# Patient Record
Sex: FEMALE | Race: WHITE | Employment: FULL TIME | URBAN - METROPOLITAN AREA
[De-identification: names, ages, dates, MRNs, and addresses within clinical notes are randomized per-mention and may not be internally consistent; named-entity substitution may affect disease eponyms.]

---

## 2017-01-27 ENCOUNTER — APPOINTMENT (OUTPATIENT)
Dept: ULTRASOUND IMAGING | Facility: CLINIC | Age: 22
End: 2017-01-27
Attending: EMERGENCY MEDICINE
Payer: COMMERCIAL

## 2017-01-27 ENCOUNTER — HOSPITAL ENCOUNTER (EMERGENCY)
Facility: CLINIC | Age: 22
Discharge: HOME | End: 2017-01-27
Attending: EMERGENCY MEDICINE | Admitting: EMERGENCY MEDICINE
Payer: COMMERCIAL

## 2017-01-27 VITALS
RESPIRATION RATE: 16 BRPM | HEIGHT: 61 IN | OXYGEN SATURATION: 100 % | WEIGHT: 124 LBS | BODY MASS INDEX: 23.41 KG/M2 | HEART RATE: 91 BPM | DIASTOLIC BLOOD PRESSURE: 73 MMHG | SYSTOLIC BLOOD PRESSURE: 118 MMHG | TEMPERATURE: 98.6 F

## 2017-01-27 DIAGNOSIS — L03.116 CELLULITIS OF LEFT LOWER EXTREMITY: ICD-10-CM

## 2017-01-27 DIAGNOSIS — L92.3 TATTOO REACTION: ICD-10-CM

## 2017-01-27 LAB
BASOPHILS # BLD AUTO: 0 10E9/L (ref 0–0.2)
BASOPHILS NFR BLD AUTO: 0.3 %
CRP SERPL-MCNC: 4.2 MG/L (ref 0–8)
DIFFERENTIAL METHOD BLD: ABNORMAL
EOSINOPHIL # BLD AUTO: 0.2 10E9/L (ref 0–0.7)
EOSINOPHIL NFR BLD AUTO: 2.4 %
ERYTHROCYTE [DISTWIDTH] IN BLOOD BY AUTOMATED COUNT: 12.6 % (ref 10–15)
ERYTHROCYTE [SEDIMENTATION RATE] IN BLOOD BY WESTERGREN METHOD: 42 MM/H (ref 0–20)
HCT VFR BLD AUTO: 35.3 % (ref 35–47)
HGB BLD-MCNC: 11.4 G/DL (ref 11.7–15.7)
IMM GRANULOCYTES # BLD: 0 10E9/L (ref 0–0.4)
IMM GRANULOCYTES NFR BLD: 0.1 %
LYMPHOCYTES # BLD AUTO: 2.2 10E9/L (ref 0.8–5.3)
LYMPHOCYTES NFR BLD AUTO: 30.6 %
MCH RBC QN AUTO: 29.8 PG (ref 26.5–33)
MCHC RBC AUTO-ENTMCNC: 32.3 G/DL (ref 31.5–36.5)
MCV RBC AUTO: 92 FL (ref 78–100)
MONOCYTES # BLD AUTO: 0.5 10E9/L (ref 0–1.3)
MONOCYTES NFR BLD AUTO: 6.7 %
NEUTROPHILS # BLD AUTO: 4.3 10E9/L (ref 1.6–8.3)
NEUTROPHILS NFR BLD AUTO: 59.9 %
PLATELET # BLD AUTO: 265 10E9/L (ref 150–450)
RBC # BLD AUTO: 3.82 10E12/L (ref 3.8–5.2)
WBC # BLD AUTO: 7.2 10E9/L (ref 4–11)

## 2017-01-27 PROCEDURE — 86140 C-REACTIVE PROTEIN: CPT | Performed by: EMERGENCY MEDICINE

## 2017-01-27 PROCEDURE — 99284 EMERGENCY DEPT VISIT MOD MDM: CPT | Performed by: EMERGENCY MEDICINE

## 2017-01-27 PROCEDURE — 85025 COMPLETE CBC W/AUTO DIFF WBC: CPT | Performed by: EMERGENCY MEDICINE

## 2017-01-27 PROCEDURE — 93971 EXTREMITY STUDY: CPT | Mod: LT

## 2017-01-27 PROCEDURE — 85652 RBC SED RATE AUTOMATED: CPT | Performed by: EMERGENCY MEDICINE

## 2017-01-27 PROCEDURE — 99284 EMERGENCY DEPT VISIT MOD MDM: CPT | Mod: 25

## 2017-01-27 RX ORDER — CEPHALEXIN 500 MG/1
500 CAPSULE ORAL 3 TIMES DAILY
Qty: 21 CAPSULE | Refills: 0 | Status: SHIPPED | OUTPATIENT
Start: 2017-01-27 | End: 2017-02-03

## 2017-01-27 RX ORDER — IBUPROFEN 600 MG/1
600 TABLET, FILM COATED ORAL EVERY 6 HOURS PRN
Qty: 40 TABLET | Refills: 0 | Status: SHIPPED | OUTPATIENT
Start: 2017-01-27 | End: 2019-03-12

## 2017-01-27 ASSESSMENT — ENCOUNTER SYMPTOMS
CHILLS: 0
VOMITING: 0
DIARRHEA: 0
FEVER: 0
NAUSEA: 0
JOINT SWELLING: 1
SHORTNESS OF BREATH: 0

## 2017-01-27 NOTE — ED PROVIDER NOTES
History     Chief Complaint   Patient presents with     Leg Swelling     The history is provided by the patient.     Angella Sutton is a 21 year old female who presents to the emergency department with left calf swelling. Patient states that on Sunday she had a new tattoo placed on her left calf and immediately had swelling and pain. She says she has 20 other tattoos and has never had this much swelling or pain with her other tattoos. Patient reports that in the morning the swelling is the worst and it takes an hour to get out of bed because the swelling is significant. She denies fever, chills, nausea, vomiting, diarrhea, urinary symptoms, chest pain, shortness of breath, left knee pain, or other skin issues. Patient has used ice and taken ibuprofen. Patient smokes 5-6 cigarettes a day. Patient went in to the Urgent Care today and they sent her to the ED for further evaluation. Patient is otherwise healthy.    I have reviewed the Medications, Allergies, Past Medical and Surgical History, and Social History in the Epic system.    Patient Active Problem List   Diagnosis     Major depressive disorder, recurrent episode, severe (H)     Generalized anxiety disorder     Trichotillomania     Other disorder of eating of nonorganic origin     Mood disorder (H)     Suicidal overdose (H)     History reviewed. No pertinent past medical history.    History reviewed. No pertinent past surgical history.    No family history on file.    Social History   Substance Use Topics     Smoking status: Current Every Day Smoker     Smokeless tobacco: Not on file      Comment: pt smokes cigarettes on and off     Alcohol Use: Yes          There is no immunization history on file for this patient.       Allergies   Allergen Reactions     Azithromycin Swelling       Current Outpatient Prescriptions   Medication Sig Dispense Refill     cephALEXin (KEFLEX) 500 MG capsule Take 1 capsule (500 mg) by mouth 3 times daily for 7 days 21 capsule 0      "ibuprofen (ADVIL/MOTRIN) 600 MG tablet Take 1 tablet (600 mg) by mouth every 6 hours as needed for moderate pain 40 tablet 0     Review of Systems   Constitutional: Negative for fever and chills.   Respiratory: Negative for shortness of breath.    Cardiovascular: Negative for chest pain.   Gastrointestinal: Negative for nausea, vomiting and diarrhea.   Genitourinary: Negative.    Musculoskeletal: Positive for joint swelling (left calf).   Skin: Negative for rash.   All other systems reviewed and are negative.      Physical Exam   BP: 118/73 mmHg  Pulse: 91  Resp: 16  Height: 154.9 cm (5' 1\")  Weight: 56.246 kg (124 lb)  SpO2: 100 %  Physical Exam   Constitutional: She is oriented to person, place, and time. She appears well-developed and well-nourished.   Healthy-appearing female sitting on the bed   HENT:   Head: Normocephalic and atraumatic.   Nose: Nose normal.   Mouth/Throat: Oropharynx is clear and moist.   Eyes: Conjunctivae and EOM are normal.   Neck: Normal range of motion. Neck supple.   Cardiovascular: Normal rate, regular rhythm, normal heart sounds and intact distal pulses.    Pulmonary/Chest: Effort normal and breath sounds normal.   Musculoskeletal: Normal range of motion.        Left knee: Normal. She exhibits normal range of motion.   Left calf with swelling, tightness, edema from foot to knee with erythema around the area of the new tattoo posteriorly along with tenderness. No crepitus, fluctuance or drainage.   Neurological: She is alert and oriented to person, place, and time.   Skin: Skin is warm and dry. She is not diaphoretic.   Patient's left calf has a very new appearing colorful tattoo. The skin is erythematous but there is not a significant amount of warmth.   Psychiatric: She has a normal mood and affect. Her behavior is normal.   Nursing note and vitals reviewed.      ED Course (with Medical Decision Making)    Pt seen and examined by me.  RN and EPIC notes reviewed.      Patient with " left calf pain and tenderness after getting a tattoo. She was sent from urgent care with concerns for possible DVT. Although this is on the list, I think she most likely has either an early cellulitis versus tattoo dye reaction. I'm going to check basic labs and an ultrasound.     Labs are normal except for elevated sed rate. Ultrasound negative.     Results discussed with the patient. I'm going to give her an Rx for Keflex. Rest, ice and elevate the leg. Ibuprofen for pain and inflammation. Follow up in clinic if not improving through the weekend and return for any worsening, changes or concerns.    Procedures     Results for orders placed or performed during the hospital encounter of 01/27/17 (from the past 24 hour(s))   CBC with platelets differential   Result Value Ref Range    WBC 7.2 4.0 - 11.0 10e9/L    RBC Count 3.82 3.8 - 5.2 10e12/L    Hemoglobin 11.4 (L) 11.7 - 15.7 g/dL    Hematocrit 35.3 35.0 - 47.0 %    MCV 92 78 - 100 fl    MCH 29.8 26.5 - 33.0 pg    MCHC 32.3 31.5 - 36.5 g/dL    RDW 12.6 10.0 - 15.0 %    Platelet Count 265 150 - 450 10e9/L    Diff Method Automated Method     % Neutrophils 59.9 %    % Lymphocytes 30.6 %    % Monocytes 6.7 %    % Eosinophils 2.4 %    % Basophils 0.3 %    % Immature Granulocytes 0.1 %    Absolute Neutrophil 4.3 1.6 - 8.3 10e9/L    Absolute Lymphocytes 2.2 0.8 - 5.3 10e9/L    Absolute Monocytes 0.5 0.0 - 1.3 10e9/L    Absolute Eosinophils 0.2 0.0 - 0.7 10e9/L    Absolute Basophils 0.0 0.0 - 0.2 10e9/L    Abs Immature Granulocytes 0.0 0 - 0.4 10e9/L   CRP inflammation   Result Value Ref Range    CRP Inflammation 4.2 0.0 - 8.0 mg/L   Erythrocyte sedimentation rate auto   Result Value Ref Range    Sed Rate 42 (H) 0 - 20 mm/h   US Lower Extremity Venous Duplex Left    Narrative    US LOWER EXTREMITY VENOUS DUPLEX LEFT 1/27/2017 5:41 PM    HISTORY: Swelling.    TECHNIQUE: Imaged deep venous structures of the left lower extremity  include the left common femoral vein, femoral  vein, popliteal vein,  and visualized posterior deep calf veins.  Color flow and spectral  Doppler with waveform analysis performed.    COMPARISON: None.    FINDINGS: No DVT is demonstrated.      Impression    IMPRESSION: Negative.     ELMO MALDONADO MD       Medications - No data to display    Assessments & Plan     I have reviewed the findings, diagnosis, plan and need for follow up with the patient.    Discharge Medication List as of 1/27/2017  6:15 PM      START taking these medications    Details   cephALEXin (KEFLEX) 500 MG capsule Take 1 capsule (500 mg) by mouth 3 times daily for 7 days, Disp-21 capsule, R-0, E-Prescribe      ibuprofen (ADVIL/MOTRIN) 600 MG tablet Take 1 tablet (600 mg) by mouth every 6 hours as needed for moderate pain, Disp-40 tablet, R-0, E-Prescribe             Final diagnoses:   Tattoo reaction   Cellulitis of left lower extremity     Disposition: Patient discharged home in stable condition. Plan as above. Return for concerns.    This document serves as a record of services personally performed by Norma Emerson MD. It was created on their behalf by Shadia Santacruz, a trained medical scribe. The creation of this record is based on the provider's personal observations and the statements of the patient. This document has been checked and approved by the attending provider.     Note: Chart documentation done in part with Dragon Voice Recognition software. Although reviewed after completion, some word and grammatical errors may remain.    1/27/2017   [facility] EMERGENCY DEPARTMENT      Norma Emerson MD  01/27/17 3488

## 2017-01-27 NOTE — ED AVS SNAPSHOT
[facility] Emergency Department    [address] [place] [address]    Phone:  [phone]    Fax:  [phone]                                       Angella Sutton   MRN: [id number]    Department:  [facility] Emergency Department   Date of Visit:  1/27/2017           Patient Information     Date Of Birth          1995        Your diagnoses for this visit were:     Tattoo reaction     Cellulitis of left lower extremity        You were seen by Norma Emerson MD.      Follow-up Information     Follow up with Clinic, [facility] In 1 week.    Why:  For wound re-check    Contact information:    [address] [place] [address] [address]  [facility] [address]  [phone]          Discharge Instructions       The ultrasound was normal.    Your lab work shows that there is some inflammation, but otherwise was normal.    Continue cool packs to the leg.    Ibuprofen for pain and inflammation.    Antibiotics as prescribed.    Elevate the leg as much as possible to decrease swelling.    Please be sure to let your tattoo people know about the leg.    Return to the ED at any time for worsening, changes or concerns.    I hope you heal quickly!!    Discharge References/Attachments     SKIN INFECTION, CELLULITIS (ENGLISH)    CELLULITIS, DISCHARGE INSTRUCTIONS FOR (ENGLISH)      24 Hour Appointment Hotline       To make an appointment at any [facility] clinic, call [phone] ([phone]). If you don't have a family doctor or clinic, we will help you find one. [facility] clinics are conveniently located to serve the needs of you and your family.             Review of your medicines      START taking        Dose / Directions Last dose taken    cephALEXin 500 MG capsule   Commonly known as:  KEFLEX   Dose:  500 mg   Quantity:  21 capsule        Take 1 capsule (500 mg) by mouth 3 times daily for 7 days   Refills:  0        ibuprofen 600 MG tablet   Commonly known as:  ADVIL/MOTRIN   Dose:   "600 mg   Quantity:  40 tablet        Take 1 tablet (600 mg) by mouth every 6 hours as needed for moderate pain   Refills:  0                Prescriptions were sent or printed at these locations (2 Prescriptions)                   [facility] [place], [place] - [address]   [address], [facility] [address]    Telephone:  [phone]   Fax:  [phone]   Hours:                  E-Prescribed (2 of 2)         cephALEXin (KEFLEX) 500 MG capsule               ibuprofen (ADVIL/MOTRIN) 600 MG tablet                Procedures and tests performed during your visit     CBC with platelets differential    CRP inflammation    Erythrocyte sedimentation rate auto    US Lower Extremity Venous Duplex Left      Orders Needing Specimen Collection     None      Pending Results     No orders found from 2017 to 2017.            Pending Culture Results     No orders found from 2017 to 2017.            Thank you for choosing [facility]       Thank you for choosing [facility] for your care. Our goal is always to provide you with excellent care. Hearing back from our patients is one way we can continue to improve our services. Please take a few minutes to complete the written survey that you may receive in the mail after you visit with us. Thank you!        [employer] Information     [employer] lets you send messages to your doctor, view your test results, renew your prescriptions, schedule appointments and more. To sign up, go to [email or web address] . Click on \"Log in\" on the left side of the screen, which will take you to the Welcome page. Then click on \"Sign up Now\" on the right side of the page.     You will be asked to enter the access code listed below, as well as some personal information. Please follow the directions to create your username and password.     Your access code is: [email or web address]  Expires: 2017  6:15 PM     Your access code will  in 90 days. If you need help or a new " code, please call your [facility] clinic or [phone].        Care EveryWhere ID     This is your Care EveryWhere ID. This could be used by other organizations to access your [facility] medical records  [id number]        After Visit Summary       This is your record. Keep this with you and show to your community pharmacist(s) and doctor(s) at your next visit.

## 2017-01-27 NOTE — ED NOTES
Tattoo 4 days ago to back of left calf and has had an increase of swelling more then normal and more pain then normal.  Pain with ambulation, went to [facility] and was sent here

## 2017-01-27 NOTE — ED AVS SNAPSHOT
[facility] Emergency Department    [address] [place] [address]    Phone:  [phone]    Fax:  [phone]                                       Angella Sutton   MRN: [id number]    Department:  [facility] Emergency Department   Date of Visit:  1/27/2017           After Visit Summary Signature Page     I have received my discharge instructions, and my questions have been answered. I have discussed any challenges I see with this plan with the nurse or doctor.    ..........................................................................................................................................  Patient/Patient Representative Signature      ..........................................................................................................................................  Patient Representative Print Name and Relationship to Patient    ..................................................               ................................................  Date                                            Time    ..........................................................................................................................................  Reviewed by Signature/Title    ...................................................              ..............................................  Date                                                            Time

## 2017-01-28 NOTE — DISCHARGE INSTRUCTIONS
The ultrasound was normal.    Your lab work shows that there is some inflammation, but otherwise was normal.    Continue cool packs to the leg.    Ibuprofen for pain and inflammation.    Antibiotics as prescribed.    Elevate the leg as much as possible to decrease swelling.    Please be sure to let your tattoo people know about the leg.    Return to the ED at any time for worsening, changes or concerns.    I hope you heal quickly!!

## 2017-01-31 ENCOUNTER — APPOINTMENT (OUTPATIENT)
Dept: ULTRASOUND IMAGING | Facility: CLINIC | Age: 22
End: 2017-01-31
Attending: FAMILY MEDICINE
Payer: COMMERCIAL

## 2017-01-31 ENCOUNTER — HOSPITAL ENCOUNTER (EMERGENCY)
Facility: CLINIC | Age: 22
Discharge: HOME | End: 2017-01-31
Attending: FAMILY MEDICINE | Admitting: FAMILY MEDICINE
Payer: COMMERCIAL

## 2017-01-31 VITALS
SYSTOLIC BLOOD PRESSURE: 101 MMHG | DIASTOLIC BLOOD PRESSURE: 74 MMHG | OXYGEN SATURATION: 96 % | WEIGHT: 123 LBS | BODY MASS INDEX: 23.22 KG/M2 | TEMPERATURE: 97.2 F | HEIGHT: 61 IN | RESPIRATION RATE: 18 BRPM

## 2017-01-31 DIAGNOSIS — L03.116 CELLULITIS OF LEFT LOWER EXTREMITY: Primary | ICD-10-CM

## 2017-01-31 LAB
BASOPHILS # BLD AUTO: 0 10E9/L (ref 0–0.2)
BASOPHILS NFR BLD AUTO: 0.4 %
CRP SERPL-MCNC: <2.9 MG/L (ref 0–8)
DIFFERENTIAL METHOD BLD: NORMAL
EOSINOPHIL # BLD AUTO: 0.2 10E9/L (ref 0–0.7)
EOSINOPHIL NFR BLD AUTO: 2.3 %
ERYTHROCYTE [DISTWIDTH] IN BLOOD BY AUTOMATED COUNT: 12.4 % (ref 10–15)
HCT VFR BLD AUTO: 36.3 % (ref 35–47)
HGB BLD-MCNC: 11.8 G/DL (ref 11.7–15.7)
IMM GRANULOCYTES # BLD: 0 10E9/L (ref 0–0.4)
IMM GRANULOCYTES NFR BLD: 0.1 %
LYMPHOCYTES # BLD AUTO: 3.2 10E9/L (ref 0.8–5.3)
LYMPHOCYTES NFR BLD AUTO: 44.6 %
MCH RBC QN AUTO: 29.6 PG (ref 26.5–33)
MCHC RBC AUTO-ENTMCNC: 32.5 G/DL (ref 31.5–36.5)
MCV RBC AUTO: 91 FL (ref 78–100)
MONOCYTES # BLD AUTO: 0.6 10E9/L (ref 0–1.3)
MONOCYTES NFR BLD AUTO: 7.9 %
NEUTROPHILS # BLD AUTO: 3.2 10E9/L (ref 1.6–8.3)
NEUTROPHILS NFR BLD AUTO: 44.7 %
PLATELET # BLD AUTO: 292 10E9/L (ref 150–450)
RBC # BLD AUTO: 3.98 10E12/L (ref 3.8–5.2)
WBC # BLD AUTO: 7.1 10E9/L (ref 4–11)

## 2017-01-31 PROCEDURE — 25000125 ZZHC RX 250: Performed by: FAMILY MEDICINE

## 2017-01-31 PROCEDURE — 96365 THER/PROPH/DIAG IV INF INIT: CPT

## 2017-01-31 PROCEDURE — 99284 EMERGENCY DEPT VISIT MOD MDM: CPT | Performed by: FAMILY MEDICINE

## 2017-01-31 PROCEDURE — 93971 EXTREMITY STUDY: CPT | Mod: LT

## 2017-01-31 PROCEDURE — 86140 C-REACTIVE PROTEIN: CPT | Performed by: FAMILY MEDICINE

## 2017-01-31 PROCEDURE — 99285 EMERGENCY DEPT VISIT HI MDM: CPT | Mod: 25

## 2017-01-31 PROCEDURE — 85025 COMPLETE CBC W/AUTO DIFF WBC: CPT | Performed by: FAMILY MEDICINE

## 2017-01-31 RX ORDER — HYDROCODONE BITARTRATE AND ACETAMINOPHEN 5; 325 MG/1; MG/1
1 TABLET ORAL EVERY 6 HOURS PRN
Qty: 5 TABLET | Refills: 0 | Status: SHIPPED | OUTPATIENT
Start: 2017-01-31 | End: 2019-03-12

## 2017-01-31 RX ORDER — CLINDAMYCIN HCL 300 MG
300 CAPSULE ORAL 4 TIMES DAILY
Qty: 40 CAPSULE | Refills: 0 | Status: SHIPPED | OUTPATIENT
Start: 2017-01-31 | End: 2017-02-10

## 2017-01-31 RX ORDER — CEFTRIAXONE 1 G/1
1 INJECTION, POWDER, FOR SOLUTION INTRAMUSCULAR; INTRAVENOUS ONCE
Status: COMPLETED | OUTPATIENT
Start: 2017-01-31 | End: 2017-01-31

## 2017-01-31 RX ADMIN — CEFTRIAXONE SODIUM 1 G: 1 INJECTION, POWDER, FOR SOLUTION INTRAMUSCULAR; INTRAVENOUS at 07:23

## 2017-01-31 ASSESSMENT — ENCOUNTER SYMPTOMS
ACTIVITY CHANGE: 1
VOMITING: 0
JOINT SWELLING: 0
FEVER: 0
WOUND: 1
DIARRHEA: 0
NUMBNESS: 0
COLOR CHANGE: 1
WEAKNESS: 0
ARTHRALGIAS: 0
CHILLS: 0
NAUSEA: 0
MYALGIAS: 1

## 2017-01-31 NOTE — ED NOTES
Pt presents with increased pain to the left lower leg.  Pt got a new tattoo to the area about 2 weeks ago.  Pt was seen about 1 week ago and prescribed antibiotics.  Pt states that these are not helping.  Pt has redness extending about 1 cm around the lower portion of the tattoo and towards the center of the tattoo.  Pt states that the swelling has improved, but not the pain and it continues to be red.

## 2017-01-31 NOTE — ED AVS SNAPSHOT
[facility] Emergency Department    [address] [place] [address]    Phone:  [phone]    Fax:  [phone]                                       Angella Sutton   MRN: [id number]    Department:  [facility] Emergency Department   Date of Visit:  1/31/2017           After Visit Summary Signature Page     I have received my discharge instructions, and my questions have been answered. I have discussed any challenges I see with this plan with the nurse or doctor.    ..........................................................................................................................................  Patient/Patient Representative Signature      ..........................................................................................................................................  Patient Representative Print Name and Relationship to Patient    ..................................................               ................................................  Date                                            Time    ..........................................................................................................................................  Reviewed by Signature/Title    ...................................................              ..............................................  Date                                                            Time

## 2017-01-31 NOTE — DISCHARGE INSTRUCTIONS
Discharge Instructions for Cellulitis  You have been diagnosed with cellulitis. This is an infection in the deepest layer of the skin. In some cases, the infection also affects the muscle. Cellulitis is caused by bacteria. The bacteria can enter the body through broken skin. This can happen with a cut, scratch, animal bite, or an insect bite that has been scratched. You may have been treated in the hospital with antibiotics and fluids. You will likely be given a prescription for antibiotics to take at home. This sheet will help you take care of yourself at home.  Home Care  When you are home:    You can stop the Keflex. I do not think this is helping. We will start you on Clindamycin, and I will send a prescription home with you.    Keep the infected area clean.    When possible, raise the infected area above the level of your heart. This helps keep swelling down.    Talk to your doctor if you are in pain. Ask what kind of over-the-counter medication you can take for pain.    Apply clean bandages as advised.    Take your temperature once a day for a week.    Wash your hands often to prevent spreading the infection.  In the future, wash your hands before and after you touch cuts, scratches, or bandages. This will help prevent infection.     Follow Up  Please see your doctor in the next week to check on this infection.    When to Call Your Doctor  Call your doctor immediately if you have any of the following:    Vomiting    Fever of100.4 F (38 C) or higher, or as directed by your health care provider    Shaking chills    Redness that gets worse in or around the infected area    Swelling of the infected area    Pain that gets worse in or around the infected area    Difficulty or pain when moving the joints above or below the infected area    Discharge or pus draining from the area       Thank you for choosing our Emergency Department for your care.     Sincerely,    Dr Gab Medina M.D.

## 2017-01-31 NOTE — ED AVS SNAPSHOT
[facility] Emergency Department    [address] [place] [address]    Phone:  [phone]    Fax:  [phone]                                       Angella Sutton   MRN: [id number]    Department:  [facility] Emergency Department   Date of Visit:  1/31/2017           Patient Information     Date Of Birth          1995        Your diagnoses for this visit were:     Cellulitis of left lower extremity        You were seen by Duane Medina MD.      Follow-up Information     Follow up with Clinic, [facility]. Schedule an appointment as soon as possible for a visit in 1 week.    Why:  For follow up of skin infection    Contact information:    [address]  [place] [address]  [phone]          Discharge Instructions         Discharge Instructions for Cellulitis  You have been diagnosed with cellulitis. This is an infection in the deepest layer of the skin. In some cases, the infection also affects the muscle. Cellulitis is caused by bacteria. The bacteria can enter the body through broken skin. This can happen with a cut, scratch, animal bite, or an insect bite that has been scratched. You may have been treated in the hospital with antibiotics and fluids. You will likely be given a prescription for antibiotics to take at home. This sheet will help you take care of yourself at home.  Home Care  When you are home:    You can stop the Keflex. I do not think this is helping. We will start you on Clindamycin, and I will send a prescription home with you.    Keep the infected area clean.    When possible, raise the infected area above the level of your heart. This helps keep swelling down.    Talk to your doctor if you are in pain. Ask what kind of over-the-counter medication you can take for pain.    Apply clean bandages as advised.    Take your temperature once a day for a week.    Wash your hands often to prevent spreading the infection.  In the future,  wash your hands before and after you touch cuts, scratches, or bandages. This will help prevent infection.     Follow Up  Please see your doctor in the next week to check on this infection.    When to Call Your Doctor  Call your doctor immediately if you have any of the following:    Vomiting    Fever of100.4 F (38 C) or higher, or as directed by your health care provider    Shaking chills    Redness that gets worse in or around the infected area    Swelling of the infected area    Pain that gets worse in or around the infected area    Difficulty or pain when moving the joints above or below the infected area    Discharge or pus draining from the area       Thank you for choosing our Emergency Department for your care.     Sincerely,    Dr Gab Medina M.D.          24 Hour Appointment Hotline       To make an appointment at any [facility], call [phone] ([phone]). If you don't have a family doctor or clinic, we will help you find one. [facility] clinics are conveniently located to serve the needs of you and your family.             Review of your medicines      START taking        Dose / Directions Last dose taken    clindamycin 300 MG capsule   Commonly known as:  CLEOCIN   Dose:  300 mg   Quantity:  40 capsule        Take 1 capsule (300 mg) by mouth 4 times daily for 10 days   Refills:  0        HYDROcodone-acetaminophen 5-325 MG per tablet   Commonly known as:  NORCO   Dose:  1 tablet   Quantity:  5 tablet        Take 1 tablet by mouth every 6 hours as needed for moderate to severe pain   Refills:  0          Our records show that you are taking the medicines listed below. If these are incorrect, please call your family doctor or clinic.        Dose / Directions Last dose taken    cephALEXin 500 MG capsule   Commonly known as:  KEFLEX   Dose:  500 mg   Quantity:  21 capsule        Take 1 capsule (500 mg) by mouth 3 times daily for 7 days   Refills:  0        ibuprofen 600 MG tablet   Commonly  "known as:  ADVIL/MOTRIN   Dose:  600 mg   Quantity:  40 tablet        Take 1 tablet (600 mg) by mouth every 6 hours as needed for moderate pain   Refills:  0                Prescriptions were sent or printed at these locations (2 Prescriptions)                   Other Prescriptions                Printed at Department/Unit printer (2 of 2)         clindamycin (CLEOCIN) 300 MG capsule               HYDROcodone-acetaminophen (NORCO) 5-325 MG per tablet                Procedures and tests performed during your visit     CBC with platelets differential    CRP inflammation    US Lower Extremity Venous Duplex Left      Orders Needing Specimen Collection     None      Pending Results     Date and Time Order Name Status Description    2017 0716 US Lower Extremity Venous Duplex Left Preliminary             Pending Culture Results     No orders found from 2017 to 2017.            Thank you for choosing [facility]       Thank you for choosing [facility] for your care. Our goal is always to provide you with excellent care. Hearing back from our patients is one way we can continue to improve our services. Please take a few minutes to complete the written survey that you may receive in the mail after you visit with us. Thank you!        [employer] Information     [employer] lets you send messages to your doctor, view your test results, renew your prescriptions, schedule appointments and more. To sign up, go to [email or web address] . Click on \"Log in\" on the left side of the screen, which will take you to the Welcome page. Then click on \"Sign up Now\" on the right side of the page.     You will be asked to enter the access code listed below, as well as some personal information. Please follow the directions to create your username and password.     Your access code is: [email or web address]  Expires: 2017  6:15 PM     Your access code will  in 90 days. If you need help or a new code, please call your [facility] clinic or " [phone].        Care EveryWhere ID     This is your Care EveryWhere ID. This could be used by other organizations to access your [facility] medical records  [id number]        After Visit Summary       This is your record. Keep this with you and show to your community pharmacist(s) and doctor(s) at your next visit.

## 2018-01-22 ENCOUNTER — OFFICE VISIT (OUTPATIENT)
Dept: FAMILY MEDICINE | Facility: CLINIC | Age: 23
End: 2018-01-22
Payer: COMMERCIAL

## 2018-01-22 VITALS
HEIGHT: 63 IN | HEART RATE: 80 BPM | RESPIRATION RATE: 16 BRPM | SYSTOLIC BLOOD PRESSURE: 100 MMHG | WEIGHT: 136.8 LBS | TEMPERATURE: 98.9 F | DIASTOLIC BLOOD PRESSURE: 68 MMHG | BODY MASS INDEX: 24.24 KG/M2

## 2018-01-22 DIAGNOSIS — Z11.3 SCREENING EXAMINATION FOR VENEREAL DISEASE: Primary | ICD-10-CM

## 2018-01-22 DIAGNOSIS — D23.9 DERMATOFIBROMA: ICD-10-CM

## 2018-01-22 PROCEDURE — 86780 TREPONEMA PALLIDUM: CPT | Performed by: PHYSICIAN ASSISTANT

## 2018-01-22 PROCEDURE — 36415 COLL VENOUS BLD VENIPUNCTURE: CPT | Performed by: PHYSICIAN ASSISTANT

## 2018-01-22 PROCEDURE — 87389 HIV-1 AG W/HIV-1&-2 AB AG IA: CPT | Performed by: PHYSICIAN ASSISTANT

## 2018-01-22 PROCEDURE — 2894A VOIDCORRECT: CPT | Performed by: PHYSICIAN ASSISTANT

## 2018-01-22 PROCEDURE — 99213 OFFICE O/P EST LOW 20 MIN: CPT | Performed by: PHYSICIAN ASSISTANT

## 2018-01-22 ASSESSMENT — PAIN SCALES - GENERAL: PAINLEVEL: NO PAIN (0)

## 2018-01-22 NOTE — PROGRESS NOTES
SUBJECTIVE:                                                    Angella Sutton is a 22 year old female who presents to clinic today for the following health issues:      STD Testing, no specific concern  Has new partner since August 2017. Had male partner in august. Did not use condom. Was on OCP for menstrual control but has been off.   Otherwise more typically female partners. Current partner female.    Would like testing to be safe.   Not using condoms.   Female partner.   Normal vaginal discharge.   No genital sores.   Has had razor burn and picked at it and opened up. And that got better on its own.   No pain when active with partner.  Patient's last menstrual period was 12/25/2017 (approximate).  Due for menses.   No increase in heaviness of bleeding.   No abd pain.  Bowels normal for her.     No hx of STD.     Spot on her right shin for a few years. Light brown, raised. Doesn't think a mole wonder if she needed to see derm or not. Worries about skin cancer.    Problem list and histories reviewed & adjusted, as indicated.  Additional history: as documented    Patient Active Problem List   Diagnosis     Major depressive disorder, recurrent episode, severe (H)     Generalized anxiety disorder     Trichotillomania     Other disorder of eating of nonorganic origin     Mood disorder (H)     Suicidal overdose (H)     History reviewed. No pertinent surgical history.    Social History   Substance Use Topics     Smoking status: Former Smoker     Smokeless tobacco: Former User      Comment: pt smokes cigarettes on and off     Alcohol use 0.0 oz/week     0 Standard drinks or equivalent per week      Comment: occ     History reviewed. No pertinent family history.      Current Outpatient Prescriptions   Medication Sig Dispense Refill     HYDROcodone-acetaminophen (NORCO) 5-325 MG per tablet Take 1 tablet by mouth every 6 hours as needed for moderate to severe pain (Patient not taking: Reported on 1/22/2018) 5 tablet 0      "ibuprofen (ADVIL/MOTRIN) 600 MG tablet Take 1 tablet (600 mg) by mouth every 6 hours as needed for moderate pain (Patient not taking: Reported on 1/22/2018) 40 tablet 0     Allergies   Allergen Reactions     Azithromycin Swelling     BP Readings from Last 3 Encounters:   01/22/18 100/68   01/31/17 101/74   01/27/17 118/73    Wt Readings from Last 3 Encounters:   01/22/18 136 lb 12.8 oz (62.1 kg)   01/31/17 123 lb (55.8 kg) (41 %)*   01/27/17 124 lb (56.2 kg) (43 %)*     * Growth percentiles are based on [facility] 2-20 Years data.                  Labs reviewed in EPIC        ROS:  Mood- \"Ok, doing better than I was\". Not on meds. No agitation. Work is pretty good. Still has some anxiety and thinks it comes out in being fidgety tapping pens or jiggling leg.     OBJECTIVE:     /68  Pulse 80  Temp 98.9  F (37.2  C) (Temporal)  Resp 16  Ht 5' 3\" (1.6 m)  Wt 136 lb 12.8 oz (62.1 kg)  LMP 12/25/2017 (Approximate)  BMI 24.23 kg/m2  Body mass index is 24.23 kg/(m^2).  GENERAL: healthy, alert and no distress  Skin: Right lower leg- medial of shin- light tan rubbery 5-6mm nodule consistent with dermatofibroma  NEURO: Normal strength and tone, mentation intact and speech normal  PSYCH: mentation appears normal, affect normal/bright    Diagnostic Test Results:  In process    ASSESSMENT/PLAN:   1. Screening examination for venereal disease  Safe sex practices discussed.  Screening as below  - Chlamydia trachomatis PCR  - Neisseria gonorrhoeae PCR  - HIV Antigen Antibody Combo  - Anti Treponema    2. Dermatofibroma  Reassurance about nodule on right shin.       Follow Up: For worsening symptoms (ie new fevers, worsening pain, etc), non-improvement as expected/discussed, questions regarding your medications or treatment plan. Discussed parameters for follow up and included in After Visit Summary given to patient.      Charlette Cartagena PA-C  [facility]"

## 2018-01-22 NOTE — MR AVS SNAPSHOT
"              After Visit Summary   2018    Angella Sutton    MRN: [id number]           Patient Information     Date Of Birth          1995        Visit Information        Provider Department      2018 3:20 PM Charlette Cartagena PA-C [facility]        Today's Diagnoses     Screening examination for venereal disease    -  1      Care Instructions    Will check std screening           Follow-ups after your visit        Who to contact     If you have questions or need follow up information about today's clinic visit or your schedule please [facility] at [phone].  Normal or non-critical lab and imaging results will be communicated to you by [email or web address], letter or phone within 4 business days after the clinic has received the results. If you do not hear from us within 7 days, please contact the clinic through [email or web address] or phone. If you have a critical or abnormal lab result, we will notify you by phone as soon as possible.  Submit refill requests through [employer] or call your pharmacy and they will forward the refill request to us. Please allow 3 business days for your refill to be completed.          Additional Information About Your Visit        MyChart Information     [employer] lets you send messages to your doctor, view your test results, renew your prescriptions, schedule appointments and more. To sign up, go to [email or web address] . Click on \"Log in\" on the left side of the screen, which will take you to the Welcome page. Then click on \"Sign up Now\" on the right side of the page.     You will be asked to enter the access code listed below, as well as some personal information. Please follow the directions to create your username and password.     Your access code is: [email or web address]  Expires: 2018  3:58 PM     Your access code will  in 90 days. If you need help or a new code, please call your [facility] or [phone].        Care EveryWhere " "ID     This is your Care EveryWhere ID. This could be used by other organizations to access your [facility] medical records  [id number]        Your Vitals Were     Pulse Temperature Respirations Height Last Period BMI (Body Mass Index)    80 98.9  F (37.2  C) (Temporal) 16 5' 3\" (1.6 m) 12/25/2017 (Approximate) 24.23 kg/m2       Blood Pressure from Last 3 Encounters:   01/22/18 100/68   01/31/17 101/74   01/27/17 118/73    Weight from Last 3 Encounters:   01/22/18 136 lb 12.8 oz (62.1 kg)   01/31/17 123 lb (55.8 kg) (41 %)*   01/27/17 124 lb (56.2 kg) (43 %)*     * Growth percentiles are based on [facility] 2-20 Years data.              We Performed the Following     Anti Treponema     Chlamydia trachomatis PCR     HIV Antigen Antibody Combo     Neisseria gonorrhoeae PCR        Primary Care Provider Office Phone # Fax #    [facility] [phone]       [address] [place] [address] [address]  [facility] [address]        Equal Access to Services     LARA LYON : Hadii irena mathias hadasho Sojolynn, waaxda luqadaha, qaybta kaalmada adereza, светлана thurston . So [facility] [phone].    ATENCIÓN: Si habla español, tiene a joyce disposición servicios gratuitos de asistencia lingüística. [facility] [phone].    We comply with applicable federal civil rights laws and [place] laws. We do not discriminate on the basis of race, color, national origin, age, disability, sex, sexual orientation, or gender identity.            Thank you!     Thank you for choosing [facility]  for your care. Our goal is always to provide you with excellent care. Hearing back from our patients is one way we can continue to improve our services. Please take a few minutes to complete the written survey that you may receive in the mail after your visit with us. Thank you!             Your Updated Medication List - Protect others around you: Learn how to safely use, store and throw away your medicines at " www.disposemymeds.org.          This list is accurate as of: 1/22/18  3:58 PM.  Always use your most recent med list.                   Brand Name Dispense Instructions for use Diagnosis    HYDROcodone-acetaminophen 5-325 MG per tablet    NORCO    5 tablet    Take 1 tablet by mouth every 6 hours as needed for moderate to severe pain    Cellulitis of left lower extremity       ibuprofen 600 MG tablet    ADVIL/MOTRIN    40 tablet    Take 1 tablet (600 mg) by mouth every 6 hours as needed for moderate pain

## 2018-01-23 LAB
C TRACH DNA SPEC QL NAA+PROBE: ABNORMAL
HIV 1+2 AB+HIV1 P24 AG SERPL QL IA: NONREACTIVE
N GONORRHOEA DNA SPEC QL NAA+PROBE: ABNORMAL
SPECIMEN SOURCE: ABNORMAL
SPECIMEN SOURCE: ABNORMAL

## 2018-01-24 LAB — T PALLIDUM IGG+IGM SER QL: NEGATIVE

## 2019-03-12 ENCOUNTER — OFFICE VISIT (OUTPATIENT)
Dept: FAMILY MEDICINE | Facility: OTHER | Age: 24
End: 2019-03-12
Payer: COMMERCIAL

## 2019-03-12 VITALS
SYSTOLIC BLOOD PRESSURE: 114 MMHG | WEIGHT: 119 LBS | DIASTOLIC BLOOD PRESSURE: 74 MMHG | HEIGHT: 63 IN | BODY MASS INDEX: 21.09 KG/M2 | TEMPERATURE: 98.9 F | RESPIRATION RATE: 16 BRPM | OXYGEN SATURATION: 97 % | HEART RATE: 100 BPM

## 2019-03-12 DIAGNOSIS — B96.89 SINUSITIS, BACTERIAL: Primary | ICD-10-CM

## 2019-03-12 DIAGNOSIS — Z30.011 ENCOUNTER FOR INITIAL PRESCRIPTION OF CONTRACEPTIVE PILLS: ICD-10-CM

## 2019-03-12 DIAGNOSIS — J32.9 SINUSITIS, BACTERIAL: Primary | ICD-10-CM

## 2019-03-12 PROCEDURE — 99214 OFFICE O/P EST MOD 30 MIN: CPT | Performed by: NURSE PRACTITIONER

## 2019-03-12 RX ORDER — LEVONORGESTREL AND ETHINYL ESTRADIOL 0.15-0.03
1 KIT ORAL DAILY
Status: CANCELLED | OUTPATIENT
Start: 2019-03-12

## 2019-03-12 RX ORDER — LEVONORGESTREL AND ETHINYL ESTRADIOL 0.15-0.03
1 KIT ORAL DAILY
Qty: 91 TABLET | Refills: 1 | Status: SHIPPED | OUTPATIENT
Start: 2019-03-12 | End: 2019-12-10

## 2019-03-12 RX ORDER — AMOXICILLIN AND CLAVULANATE POTASSIUM 500; 125 MG/1; MG/1
1 TABLET, FILM COATED ORAL 2 TIMES DAILY
Qty: 20 TABLET | Refills: 0 | Status: SHIPPED | OUTPATIENT
Start: 2019-03-12 | End: 2019-09-04

## 2019-03-12 ASSESSMENT — MIFFLIN-ST. JEOR: SCORE: 1273.78

## 2019-03-12 ASSESSMENT — PAIN SCALES - GENERAL: PAINLEVEL: NO PAIN (0)

## 2019-03-12 NOTE — PROGRESS NOTES
SUBJECTIVE:   Angella Sutton is a 23 year old female who presents to clinic today for the following health issues:      HPI  Acute Illness   Acute illness concerns: cough  Onset: about a month     Fever: no     Chills/Sweats: no     Headache (location?): YES- occasionally     Sinus Pressure:no    Conjunctivitis:  no    Ear Pain: no    Rhinorrhea: YES    Congestion: YES    Sore Throat: no      Cough: YES-non-productive    Wheeze: YES    Decreased Appetite: YES    Nausea: no    Vomiting: no    Diarrhea:  no    Dysuria/Freq.: no    Fatigue/Achiness: YES    Sick/Strep Exposure: no      Therapies Tried and outcome: none     Problem list and histories reviewed & adjusted, as indicated.  Additional history: as documented    Birth Control           Description (location/character/radiation): was taking the pill about 2 years ago, would like to restart.   She was taking seasonale 3 mos on 1 week off states tolerated this well   Last day of menstrual cycle 2 days ago.        Patient Active Problem List   Diagnosis     Major depressive disorder, recurrent episode, severe (H)     Generalized anxiety disorder     Trichotillomania     Other disorder of eating of nonorganic origin     Mood disorder (H)     Suicidal overdose (H)     History reviewed. No pertinent surgical history.    Social History     Tobacco Use     Smoking status: Former Smoker     Smokeless tobacco: Former User     Tobacco comment: pt smokes cigarettes on and off   Substance Use Topics     Alcohol use: Yes     Alcohol/week: 0.0 oz     Comment: occ     History reviewed. No pertinent family history.      Current Outpatient Medications   Medication Sig Dispense Refill     amoxicillin-clavulanate (AUGMENTIN) 500-125 MG tablet Take 1 tablet by mouth 2 times daily for 10 days 20 tablet 0     levonorgestrel-ethinyl estradiol (SEASONALE) 0.15-0.03 MG tablet Take 1 tablet by mouth daily 91 tablet 1     Allergies   Allergen Reactions     Azithromycin Swelling      "Sulfamethoxazole-Trimethoprim Rash     possible     BP Readings from Last 3 Encounters:   03/12/19 114/74   01/22/18 100/68   01/31/17 101/74    Wt Readings from Last 3 Encounters:   03/12/19 54 kg (119 lb)   01/22/18 62.1 kg (136 lb 12.8 oz)   01/31/17 55.8 kg (123 lb) (41 %)*     * Growth percentiles are based on CDC (Girls, 2-20 Years) data.                  Labs reviewed in EPIC    ROS:  Constitutional, HEENT, cardiovascular, pulmonary, GI, , musculoskeletal, neuro, skin, endocrine and psych systems are negative, except as otherwise noted.    OBJECTIVE:     /74   Pulse 100   Temp 98.9  F (37.2  C) (Temporal)   Resp 16   Ht 1.6 m (5' 2.99\")   Wt 54 kg (119 lb)   SpO2 97%   BMI 21.09 kg/m    Body mass index is 21.09 kg/m .  GENERAL: alert, no distress and fatigued  EYES: Eyes grossly normal to inspection, PERRL and conjunctivae and sclerae normal  HENT: normal cephalic/atraumatic, ear canals and TM's normal, nose and mouth without ulcers or lesions, nasal mucosa edematous , rhinorrhea yellow, oropharynx clear, oral mucous membranes moist and tonsillar erythema  NECK: bilateral anterior cervical adenopathy, no asymmetry, masses, or scars and thyroid normal to palpation  RESP: lungs clear to auscultation - no rales, rhonchi or wheezes  CV: regular rate and rhythm, normal S1 S2, no S3 or S4, no murmur, click or rub, no peripheral edema and peripheral pulses strong  PSYCH: mentation appears normal, affect normal/bright        ASSESSMENT/PLAN:     1. Sinusitis, bacterial  - Due to length of symptoms, will treat   - Discussed antibiotic use, duration, and side effects  - Recommend rest and fluids  - Can continue with nyquil/dayquil   - Hand out given     The patient indicates understanding of these issues and agrees with the plan.     Follow up: PRN        - amoxicillin-clavulanate (AUGMENTIN) 500-125 MG tablet; Take 1 tablet by mouth 2 times daily for 10 days  Dispense: 20 tablet; Refill: 0    2. " Encounter for initial prescription of contraceptive pills  Discussed OCP will need to use back up condom for 14 days   Home care instructions were reviewed with the patient. The risks, benefits and treatment options of prescribed medications or other treatments have been discussed with the patient. The patient verbalized their understanding and should call or follow up if no improvement or if they develop further problems.    - levonorgestrel-ethinyl estradiol (SEASONALE) 0.15-0.03 MG tablet; Take 1 tablet by mouth daily  Dispense: 91 tablet; Refill: 1        NIMA [facility]

## 2019-03-19 NOTE — PROGRESS NOTES
"  SUBJECTIVE:   Angella Sutton is a 23 year old female who presents to clinic today for the following health issues:      HPI   Answers for HPI/ROS submitted by the patient on 3/20/2019   If you checked off any problems, how difficult have these problems made it for you to do your work, take care of things at home, or get along with other people?: Somewhat difficult  PHQ9 TOTAL SCORE: 11  SEEMA 7 TOTAL SCORE: 10    Patient would like STD testing done. Her s/o had chlamydia and she thinks she was exposed when they were \"fooling around\". She is very worried that she has chlamydia and would like to be tested. She also has not had her Pap smear done since turning 23.    Problem list and histories reviewed & adjusted, as indicated.  Additional history: as documented    Patient Active Problem List   Diagnosis     Major depressive disorder, recurrent episode, severe (H)     Generalized anxiety disorder     Trichotillomania     Other disorder of eating of nonorganic origin     Mood disorder (H)     Suicidal overdose (H)     History reviewed. No pertinent surgical history.    Social History     Tobacco Use     Smoking status: Former Smoker     Smokeless tobacco: Former User     Tobacco comment: pt smokes cigarettes on and off   Substance Use Topics     Alcohol use: Yes     Alcohol/week: 0.0 oz     Comment: occ     Family History   Problem Relation Age of Onset     Lupus Maternal Grandmother          Current Outpatient Medications   Medication Sig Dispense Refill     levonorgestrel-ethinyl estradiol (SEASONALE) 0.15-0.03 MG tablet Take 1 tablet by mouth daily 91 tablet 1     amoxicillin-clavulanate (AUGMENTIN) 500-125 MG tablet Take 1 tablet by mouth 2 times daily for 10 days (Patient not taking: Reported on 3/20/2019) 20 tablet 0     Allergies   Allergen Reactions     Azithromycin Swelling     Sulfamethoxazole-Trimethoprim Rash     possible     BP Readings from Last 3 Encounters:   03/20/19 104/62   03/12/19 114/74   01/22/18 " "100/68    Wt Readings from Last 3 Encounters:   03/20/19 56 kg (123 lb 8 oz)   03/12/19 54 kg (119 lb)   01/22/18 62.1 kg (136 lb 12.8 oz)                  Labs reviewed in EPIC    ROS:  Constitutional, HEENT, cardiovascular, pulmonary, gi and gu systems are negative, except as otherwise noted.    OBJECTIVE:     /62   Pulse 76   Temp 97.3  F (36.3  C) (Temporal)   Resp 16   Ht 1.6 m (5' 2.99\")   Wt 56 kg (123 lb 8 oz)   BMI 21.88 kg/m    Body mass index is 21.88 kg/m .  GENERAL: healthy, alert and no distress   (female): normal female external genitalia, normal urethral meatus, vaginal mucosa, normal cervix/adnexa/uterus without masses or discharge  MS: no gross musculoskeletal defects noted, no edema    Diagnostic Test Results:  No results found for this or any previous visit (from the past 24 hour(s)).    ASSESSMENT/PLAN:     1. Screening examination for venereal disease  - NEISSERIA GONORRHOEA PCR  - CHLAMYDIA TRACHOMATIS PCR    2. Screening for malignant neoplasm of cervix  - Pap imaged thin layer screen only - recommended age 21 - 24 years    NIMA Durbin [facility]  "

## 2019-03-20 ENCOUNTER — OFFICE VISIT (OUTPATIENT)
Dept: FAMILY MEDICINE | Facility: OTHER | Age: 24
End: 2019-03-20
Payer: COMMERCIAL

## 2019-03-20 VITALS
HEIGHT: 63 IN | WEIGHT: 123.5 LBS | RESPIRATION RATE: 16 BRPM | HEART RATE: 76 BPM | BODY MASS INDEX: 21.88 KG/M2 | TEMPERATURE: 97.3 F | SYSTOLIC BLOOD PRESSURE: 104 MMHG | DIASTOLIC BLOOD PRESSURE: 62 MMHG

## 2019-03-20 DIAGNOSIS — Z12.4 SCREENING FOR MALIGNANT NEOPLASM OF CERVIX: ICD-10-CM

## 2019-03-20 DIAGNOSIS — Z11.3 SCREENING EXAMINATION FOR VENEREAL DISEASE: Primary | ICD-10-CM

## 2019-03-20 PROCEDURE — 87591 N.GONORRHOEAE DNA AMP PROB: CPT | Performed by: STUDENT IN AN ORGANIZED HEALTH CARE EDUCATION/TRAINING PROGRAM

## 2019-03-20 PROCEDURE — 99213 OFFICE O/P EST LOW 20 MIN: CPT | Performed by: STUDENT IN AN ORGANIZED HEALTH CARE EDUCATION/TRAINING PROGRAM

## 2019-03-20 PROCEDURE — 87491 CHLMYD TRACH DNA AMP PROBE: CPT | Performed by: STUDENT IN AN ORGANIZED HEALTH CARE EDUCATION/TRAINING PROGRAM

## 2019-03-20 PROCEDURE — G0145 SCR C/V CYTO,THINLAYER,RESCR: HCPCS | Performed by: STUDENT IN AN ORGANIZED HEALTH CARE EDUCATION/TRAINING PROGRAM

## 2019-03-20 ASSESSMENT — ANXIETY QUESTIONNAIRES
4. TROUBLE RELAXING: MORE THAN HALF THE DAYS
6. BECOMING EASILY ANNOYED OR IRRITABLE: NEARLY EVERY DAY
1. FEELING NERVOUS, ANXIOUS, OR ON EDGE: SEVERAL DAYS
7. FEELING AFRAID AS IF SOMETHING AWFUL MIGHT HAPPEN: NOT AT ALL
3. WORRYING TOO MUCH ABOUT DIFFERENT THINGS: SEVERAL DAYS
GAD7 TOTAL SCORE: 10
7. FEELING AFRAID AS IF SOMETHING AWFUL MIGHT HAPPEN: NOT AT ALL
2. NOT BEING ABLE TO STOP OR CONTROL WORRYING: SEVERAL DAYS
GAD7 TOTAL SCORE: 10
GAD7 TOTAL SCORE: 10
5. BEING SO RESTLESS THAT IT IS HARD TO SIT STILL: MORE THAN HALF THE DAYS

## 2019-03-20 ASSESSMENT — PATIENT HEALTH QUESTIONNAIRE - PHQ9
10. IF YOU CHECKED OFF ANY PROBLEMS, HOW DIFFICULT HAVE THESE PROBLEMS MADE IT FOR YOU TO DO YOUR WORK, TAKE CARE OF THINGS AT HOME, OR GET ALONG WITH OTHER PEOPLE: SOMEWHAT DIFFICULT
SUM OF ALL RESPONSES TO PHQ QUESTIONS 1-9: 11
SUM OF ALL RESPONSES TO PHQ QUESTIONS 1-9: 11

## 2019-03-20 ASSESSMENT — MIFFLIN-ST. JEOR: SCORE: 1294.19

## 2019-03-20 NOTE — LETTER
March 26, 2019      Angella BEN Lesley  [address]  [place] [address]    Dear ,      I am happy to inform you that your recent cervical cancer screening test (PAP smear) was normal.      Preventative screenings such as this help to ensure your health for years to come. You should repeat a pap smear in 3 years, unless otherwise directed.      You will still need to return to the clinic every year for your annual exam and other preventive tests.     If you have additional questions regarding this result, please call our registered nurse, [place] at [phone].      Sincerely,      NIMA Durbin CNP/es

## 2019-03-21 LAB
C TRACH DNA SPEC QL NAA+PROBE: NEGATIVE
N GONORRHOEA DNA SPEC QL NAA+PROBE: NEGATIVE
SPECIMEN SOURCE: NORMAL
SPECIMEN SOURCE: NORMAL

## 2019-03-21 ASSESSMENT — ANXIETY QUESTIONNAIRES: GAD7 TOTAL SCORE: 10

## 2019-03-21 ASSESSMENT — PATIENT HEALTH QUESTIONNAIRE - PHQ9: SUM OF ALL RESPONSES TO PHQ QUESTIONS 1-9: 11

## 2019-03-22 LAB
COPATH REPORT: NORMAL
PAP: NORMAL

## 2019-09-03 NOTE — PROGRESS NOTES
"Subjective     Angella Sutton is a 24 year old female who presents to clinic today for the following health issues:    HPI   Patient is here for STD check.  Patient was notified yesterday that she was exposed to someone with HPV.  She is wanting testing for this.  She did get the vaccination series in 2009.    She has a history of depression that has been ongoing.  She has been on multiple different medications which did not help her symptoms.  She states she feels that she ketan if it pretty well.  She has passive thoughts of suicidal ideation but has no intent or plan to harm herself.  She states \"actually the thought of dying terrifies me\".  She says that she is thinking about getting a cat as she is heard that that helps with depression since the medications have not been helpful.    Answers for HPI/ROS submitted by the patient on 9/4/2019   If you checked off any problems, how difficult have these problems made it for you to do your work, take care of things at home, or get along with other people?: Somewhat difficult  PHQ9 TOTAL SCORE: 20    Patient Active Problem List   Diagnosis     Generalized anxiety disorder     Trichotillomania     Other disorder of eating of nonorganic origin     Moderate episode of recurrent major depressive disorder (H)     History reviewed. No pertinent surgical history.    Social History     Tobacco Use     Smoking status: Former Smoker     Smokeless tobacco: Former User     Tobacco comment: pt smokes cigarettes on and off   Substance Use Topics     Alcohol use: Yes     Alcohol/week: 0.0 oz     Comment: occ     Family History   Problem Relation Age of Onset     Lupus Maternal Grandmother          Current Outpatient Medications   Medication Sig Dispense Refill     levonorgestrel-ethinyl estradiol (SEASONALE) 0.15-0.03 MG tablet Take 1 tablet by mouth daily 91 tablet 1     Allergies   Allergen Reactions     Azithromycin Swelling     Sulfamethoxazole-Trimethoprim Rash     possible " "    BP Readings from Last 3 Encounters:   09/04/19 104/56   03/20/19 104/62   03/12/19 114/74    Wt Readings from Last 3 Encounters:   09/04/19 60.2 kg (132 lb 12.8 oz)   03/20/19 56 kg (123 lb 8 oz)   03/12/19 54 kg (119 lb)                    Reviewed and updated as needed this visit by Provider         Review of Systems   ROS COMP: Constitutional, HEENT, cardiovascular, pulmonary, gi and gu systems are negative, except as otherwise noted.      Objective    /56   Pulse 76   Temp 98.1  F (36.7  C) (Temporal)   Resp 16   Ht 1.6 m (5' 2.99\")   Wt 60.2 kg (132 lb 12.8 oz)   BMI 23.53 kg/m    Body mass index is 23.53 kg/m .  Physical Exam   GENERAL: healthy, alert and no distress  MS: no gross musculoskeletal defects noted  SKIN: no suspicious lesions or rashes  NEURO: Normal strength and tone, mentation intact and speech normal  PSYCH: mentation appears normal, affect normal/bright    Diagnostic Test Results:  Labs reviewed in Epic        Assessment & Plan     1. Screen for STD (sexually transmitted disease)  Discussed that she is had the immunization series for HPV so her body should take care of any HPV that she is exposed to.  She is interested in screening for gonorrhea and chlamydia today.  - NEISSERIA GONORRHOEA PCR  - CHLAMYDIA TRACHOMATIS PCR    2. Moderate episode of recurrent major depressive disorder (H)  Stable.  Denies intent or plan to harm herself.  She is looking at getting a cat as a therapy and on for her depression since medications are not helped her in the past.    No follow-ups on file.    NIMA [facility]    "

## 2019-09-04 ENCOUNTER — OFFICE VISIT (OUTPATIENT)
Dept: FAMILY MEDICINE | Facility: OTHER | Age: 24
End: 2019-09-04
Payer: COMMERCIAL

## 2019-09-04 VITALS
HEIGHT: 63 IN | DIASTOLIC BLOOD PRESSURE: 56 MMHG | SYSTOLIC BLOOD PRESSURE: 104 MMHG | WEIGHT: 132.8 LBS | RESPIRATION RATE: 16 BRPM | HEART RATE: 76 BPM | BODY MASS INDEX: 23.53 KG/M2 | TEMPERATURE: 98.1 F

## 2019-09-04 DIAGNOSIS — Z11.3 SCREEN FOR STD (SEXUALLY TRANSMITTED DISEASE): Primary | ICD-10-CM

## 2019-09-04 DIAGNOSIS — F33.1 MODERATE EPISODE OF RECURRENT MAJOR DEPRESSIVE DISORDER (H): ICD-10-CM

## 2019-09-04 PROCEDURE — 99213 OFFICE O/P EST LOW 20 MIN: CPT | Performed by: STUDENT IN AN ORGANIZED HEALTH CARE EDUCATION/TRAINING PROGRAM

## 2019-09-04 PROCEDURE — 87591 N.GONORRHOEAE DNA AMP PROB: CPT | Performed by: STUDENT IN AN ORGANIZED HEALTH CARE EDUCATION/TRAINING PROGRAM

## 2019-09-04 PROCEDURE — 87491 CHLMYD TRACH DNA AMP PROBE: CPT | Performed by: STUDENT IN AN ORGANIZED HEALTH CARE EDUCATION/TRAINING PROGRAM

## 2019-09-04 ASSESSMENT — PATIENT HEALTH QUESTIONNAIRE - PHQ9
SUM OF ALL RESPONSES TO PHQ QUESTIONS 1-9: 20
10. IF YOU CHECKED OFF ANY PROBLEMS, HOW DIFFICULT HAVE THESE PROBLEMS MADE IT FOR YOU TO DO YOUR WORK, TAKE CARE OF THINGS AT HOME, OR GET ALONG WITH OTHER PEOPLE: SOMEWHAT DIFFICULT
SUM OF ALL RESPONSES TO PHQ QUESTIONS 1-9: 20

## 2019-09-04 ASSESSMENT — MIFFLIN-ST. JEOR: SCORE: 1331.38

## 2019-09-04 NOTE — LETTER
My Depression Action Plan  Name: Angella Sutton   Date of Birth 1995  Date: 9/3/2019    My doctor: [facility]   My clinic: [address] [address] [phone]          GREEN    ZONE   Good Control    What it looks like:     Things are going generally well. You have normal up s and down s. You may even feel depressed from time to time, but bad moods usually last less than a day.   What you need to do:  1. Continue to care for yourself (see self care plan)  2. Check your depression survival kit and update it as needed  3. Follow your physician s recommendations including any medication.  4. Do not stop taking medication unless you consult with your physician first.           YELLOW         ZONE Getting Worse    What it looks like:     Depression is starting to interfere with your life.     It may be hard to get out of bed; you may be starting to isolate yourself from others.    Symptoms of depression are starting to last most all day and this has happened for several days.     You may have suicidal thoughts but they are not constant.   What you need to do:     1. Call your care team, your response to treatment will improve if you keep your care team informed of your progress. Yellow periods are signs an adjustment may need to be made.     2. Continue your self-care, even if you have to fake it!    3. Talk to someone in your support network    4. Open up your depression survival kit           RED    ZONE Medical Alert - Get Help    What it looks like:     Depression is seriously interfering with your life.     You may experience these or other symptoms: You can t get out of bed most days, can t work or engage in other necessary activities, you have trouble taking care of basic hygiene, or basic responsibilities, thoughts of suicide or death that will not go away, self-injurious behavior.     What you need to do:  1. Call your care team  and request a same-day appointment. If they are not available (weekends or after hours) call your local crisis line, emergency room or 911.            Depression Self Care Plan / Survival Kit    Self-Care for Depression  Here s the deal. Your body and mind are really not as separate as most people think.  What you do and think affects how you feel and how you feel influences what you do and think. This means if you do things that people who feel good do, it will help you feel better.  Sometimes this is all it takes.  There is also a place for medication and therapy depending on how severe your depression is, so be sure to consult with your medical provider and/ or Behavioral Health Consultant if your symptoms are worsening or not improving.     In order to better manage my stress, I will:    Exercise  Get some form of exercise, every day. This will help reduce pain and release endorphins, the  feel good  chemicals in your brain. This is almost as good as taking antidepressants!  This is not the same as joining a gym and then never going! (they count on that by the way ) It can be as simple as just going for a walk or doing some gardening, anything that will get you moving.      Hygiene   Maintain good hygiene (Get out of bed in the morning, Make your bed, Brush your teeth, Take a shower, and Get dressed like you were going to work, even if you are unemployed).  If your clothes don't fit try to get ones that do.    Diet  I will strive to eat foods that are good for me, drink plenty of water, and avoid excessive sugar, caffeine, alcohol, and other mood-altering substances.  Some foods that are helpful in depression are: complex carbohydrates, B vitamins, flaxseed, fish or fish oil, fresh fruits and vegetables.    Psychotherapy  I agree to participate in Individual Therapy (if recommended).    Medication  If prescribed medications, I agree to take them.  Missing doses can result in serious side effects.  I understand  that drinking alcohol, or other illicit drug use, may cause potential side effects.  I will not stop my medication abruptly without first discussing it with my provider.    Staying Connected With Others  I will stay in touch with my friends, family members, and my primary care provider/team.    Use your imagination  Be creative.  We all have a creative side; it doesn t matter if it s oil painting, sand castles, or mud pies! This will also kick up the endorphins.    Witness Beauty  (AKA stop and smell the roses) Take a look outside, even in mid-winter. Notice colors, textures. Watch the squirrels and birds.     Service to others  Be of service to others.  There is always someone else in need.  By helping others we can  get out of ourselves  and remember the really important things.  This also provides opportunities for practicing all the other parts of the program.    Humor  Laugh and be silly!  Adjust your TV habits for less news and crime-drama and more comedy.    Control your stress  Try breathing deep, massage therapy, biofeedback, and meditation. Find time to relax each day.     My support system    Clinic Contact:  Phone number:    Contact 1:  Phone number:    Contact 2:  Phone number:    Sikhism/:  Phone number:    Therapist:  Phone number:    Local crisis center:    Phone number:    Other community support:  Phone number:

## 2019-09-05 ASSESSMENT — PATIENT HEALTH QUESTIONNAIRE - PHQ9: SUM OF ALL RESPONSES TO PHQ QUESTIONS 1-9: 20

## 2019-09-17 ENCOUNTER — OFFICE VISIT (OUTPATIENT)
Dept: FAMILY MEDICINE | Facility: OTHER | Age: 24
End: 2019-09-17
Payer: COMMERCIAL

## 2019-09-17 VITALS
OXYGEN SATURATION: 100 % | HEART RATE: 100 BPM | DIASTOLIC BLOOD PRESSURE: 54 MMHG | WEIGHT: 130 LBS | SYSTOLIC BLOOD PRESSURE: 90 MMHG | TEMPERATURE: 98.6 F | BODY MASS INDEX: 23.04 KG/M2 | RESPIRATION RATE: 16 BRPM | HEIGHT: 63 IN

## 2019-09-17 DIAGNOSIS — R82.90 NONSPECIFIC FINDING ON EXAMINATION OF URINE: ICD-10-CM

## 2019-09-17 DIAGNOSIS — R31.9 HEMATURIA, UNSPECIFIED TYPE: ICD-10-CM

## 2019-09-17 DIAGNOSIS — N30.01 ACUTE CYSTITIS WITH HEMATURIA: Primary | ICD-10-CM

## 2019-09-17 LAB
ALBUMIN UR-MCNC: 30 MG/DL
APPEARANCE UR: CLEAR
BACTERIA #/AREA URNS HPF: ABNORMAL /HPF
BILIRUB UR QL STRIP: NEGATIVE
COLOR UR AUTO: YELLOW
GLUCOSE UR STRIP-MCNC: NEGATIVE MG/DL
HGB UR QL STRIP: ABNORMAL
KETONES UR STRIP-MCNC: ABNORMAL MG/DL
LEUKOCYTE ESTERASE UR QL STRIP: ABNORMAL
MUCOUS THREADS #/AREA URNS LPF: PRESENT /LPF
NITRATE UR QL: POSITIVE
NON-SQ EPI CELLS #/AREA URNS LPF: ABNORMAL /LPF
PH UR STRIP: 8.5 PH (ref 5–7)
RBC #/AREA URNS AUTO: ABNORMAL /HPF
SOURCE: ABNORMAL
SP GR UR STRIP: 1.01 (ref 1–1.03)
UROBILINOGEN UR STRIP-ACNC: 1 EU/DL (ref 0.2–1)
WBC #/AREA URNS AUTO: >100 /HPF
WBC CLUMPS #/AREA URNS HPF: PRESENT /HPF

## 2019-09-17 PROCEDURE — 81001 URINALYSIS AUTO W/SCOPE: CPT | Performed by: PHYSICIAN ASSISTANT

## 2019-09-17 PROCEDURE — 99213 OFFICE O/P EST LOW 20 MIN: CPT | Performed by: PHYSICIAN ASSISTANT

## 2019-09-17 PROCEDURE — 87186 SC STD MICRODIL/AGAR DIL: CPT | Performed by: PHYSICIAN ASSISTANT

## 2019-09-17 PROCEDURE — 87086 URINE CULTURE/COLONY COUNT: CPT | Performed by: PHYSICIAN ASSISTANT

## 2019-09-17 PROCEDURE — 87088 URINE BACTERIA CULTURE: CPT | Performed by: PHYSICIAN ASSISTANT

## 2019-09-17 RX ORDER — CIPROFLOXACIN 500 MG/1
500 TABLET, FILM COATED ORAL 2 TIMES DAILY
Qty: 14 TABLET | Refills: 0 | Status: SHIPPED | OUTPATIENT
Start: 2019-09-17 | End: 2019-12-10

## 2019-09-17 ASSESSMENT — MIFFLIN-ST. JEOR: SCORE: 1318.68

## 2019-09-17 NOTE — LETTER
September 17, 2019      Angella Sutton  [address]  [place] [address]        To Whom It May Concern:    Angella Sutton was seen on 9/17/2019.  Please excuse her from work today.        Sincerely,        Maegan Tatum PA-C

## 2019-09-17 NOTE — PROGRESS NOTES
Subjective     Angella Sutton is a 24 year old female who presents to clinic today for the following health issues:    HPI   Concern - abdominal pain/ lower right back pain  Onset: 3 days    Description:   Abdominal pain with nausea on Sunday then moved to right lower back radiates to right lower pelvic area    Intensity: 6/10    Progression of Symptoms:  worsening    Accompanying Signs & Symptoms:  Friday had urinary frequency and noticed some blood    Previous history of similar problem:   no    Precipitating factors:   Worsened by: movement    Alleviating factors:  Improved by: putting pressure on it and essential oil saturday     Therapies Tried and outcome: none    Presents with right flank pain that started 4 days ago. On Friday says there was hematuria but didn't think anything of it as she spots with her birth control. She noted that she had increased frequency of urination that day. On Saturday she had hematuria again and a sore back. Her mom gave her some essential oils and she said she felt better then. She woke up from the pain that night. She has not seen any blood in her urine since Saturday. She says the pain moved to her front suprapubic area on Sunday then went back to her right lower back again. Went to work on Monday but could not work the whole shift due to the pain. Will need a work note today. Denies fever, chills, night sweats. Has a history of UTI's in the past. No history of kidney stones.    Patient Active Problem List   Diagnosis     Generalized anxiety disorder     Trichotillomania     Other disorder of eating of nonorganic origin     Moderate episode of recurrent major depressive disorder (H)     No past surgical history on file.    Social History     Tobacco Use     Smoking status: Current Every Day Smoker     Smokeless tobacco: Former User     Tobacco comment: pt smokes cigarettes on and off   Substance Use Topics     Alcohol use: Yes     Alcohol/week: 0.0 oz     Comment: occ     Family  "History   Problem Relation Age of Onset     Lupus Maternal Grandmother          Current Outpatient Medications   Medication Sig Dispense Refill     ciprofloxacin (CIPRO) 500 MG tablet Take 1 tablet (500 mg) by mouth 2 times daily for 7 days 14 tablet 0     levonorgestrel-ethinyl estradiol (SEASONALE) 0.15-0.03 MG tablet Take 1 tablet by mouth daily 91 tablet 1     Allergies   Allergen Reactions     Azithromycin Swelling     Sulfamethoxazole-Trimethoprim Rash     possible     Reviewed and updated as needed this visit by Provider         Review of Systems   ROS COMP: Constitutional, HEENT, cardiovascular, pulmonary, gi and gu systems are negative, except as otherwise noted.      Objective    BP 90/54   Pulse 100   Temp 98.6  F (37  C) (Temporal)   Resp 16   Ht 1.6 m (5' 2.99\")   Wt 59 kg (130 lb)   SpO2 100%   BMI 23.03 kg/m    Body mass index is 23.03 kg/m .  Physical Exam   GENERAL: healthy, alert and no distress  RESP: lungs clear to auscultation - no rales, rhonchi or wheezes  CV: regular rate and rhythm, normal S1 S2, no S3 or S4, no murmur, click or rub, no peripheral edema and peripheral pulses strong  ABDOMEN: soft, nontender, no hepatosplenomegaly, no masses and bowel sounds normal  SKIN: no suspicious lesions or rashes  BACK: CVA tenderness, no paralumbar tenderness  PSYCH: mentation appears normal, affect normal/bright    Diagnostic Test Results:  Labs reviewed in Epic  Results for orders placed or performed in visit on 09/17/19 (from the past 24 hour(s))   UA reflex to Microscopic and Culture   Result Value Ref Range    Color Urine Yellow     Appearance Urine Clear     Glucose Urine Negative NEG^Negative mg/dL    Bilirubin Urine Negative NEG^Negative    Ketones Urine Trace (A) NEG^Negative mg/dL    Specific Gravity Urine 1.015 1.003 - 1.035    Blood Urine Small (A) NEG^Negative    pH Urine 8.5 (H) 5.0 - 7.0 pH    Protein Albumin Urine 30 (A) NEG^Negative mg/dL    Urobilinogen Urine 1.0 0.2 - 1.0 " EU/dL    Nitrite Urine Positive (A) NEG^Negative    Leukocyte Esterase Urine Large (A) NEG^Negative    Source Unspecified Urine    Urine Microscopic   Result Value Ref Range    WBC Urine >100 (A) OTO5^0 - 5 /HPF    RBC Urine 5-10 (A) OTO2^O - 2 /HPF    WBC Clumps Present (A) NEG^Negative /HPF    Squamous Epithelial /LPF Urine Few FEW^Few /LPF    Bacteria Urine Few (A) NEG^Negative /HPF    Mucous Urine Present (A) NEG^Negative /LPF           Assessment & Plan     1. Hematuria  - UA reflex to Microscopic and Culture  - Urine Microscopic    2. Nonspecific finding on examination of urine  - Urine Culture Aerobic Bacterial    3. Acute cystitis with hematuria  Symptoms and exam most consistent with cystitis that is on its way to pyelonephritis. Antibiotics will cover both and prevent further progression. Continue to push fluids and supportives.  Wrote a note to stay home form work for the day. Follow up if symptoms worsen or do not improve.  - ciprofloxacin (CIPRO) 500 MG tablet; Take 1 tablet (500 mg) by mouth 2 times daily for 7 days  Dispense: 14 tablet; Refill: 0      The patient indicates understanding of these issues and agrees with the plan.    Maegan Tatum PA-C  [facility]

## 2019-09-18 LAB
BACTERIA SPEC CULT: ABNORMAL
Lab: ABNORMAL
SPECIMEN SOURCE: ABNORMAL

## 2019-09-25 ENCOUNTER — NURSE TRIAGE (OUTPATIENT)
Dept: FAMILY MEDICINE | Facility: OTHER | Age: 24
End: 2019-09-25

## 2019-09-25 NOTE — TELEPHONE ENCOUNTER
Has been getting her period for 12 years. Last night was weirdest thing came out: like tissue like a wadded up piece.    Was on birth control for a while and it now off.  No chance of pregnancy.  Discussed build up of tissues when skipping periods.  Will continue to monitor and if not improving needs to be seen.  Agrees to plan.    Tal Cummings, RN, BSN        Additional Information    Normal menstrual flow    Negative: SEVERE vaginal bleeding (e.g., continuous red blood from vagina, or large blood clots) and very weak (can't stand)    Negative: Passed out (i.e., fainted, collapsed and was not responding)    Negative: Difficult to awaken or acting confused (e.g., disoriented, slurred speech)    Negative: Shock suspected (e.g., cold/pale/clammy skin, too weak to stand, low BP, rapid pulse)    Negative: Sounds like a life-threatening emergency to the triager    Negative: Pregnant > 20 weeks (5 months or more)    Negative: Pregnant < 20 weeks (less than 5 months)    Negative: Postpartum < 1 month since delivery ('Did you recently give birth?')    Negative: Vaginal discharge is the main symptom and bleeding is slight    Negative: SEVERE abdominal pain (e.g., excruciating)    Negative: SEVERE dizziness (e.g., unable to stand, requires support to walk, feels like passing out now)    Negative: SEVERE vaginal bleeding (i.e., soaking 2 pads or tampons per hour and present 2 or more hours; 1 menstrual cup every 2 hours)    Negative: MODERATE vaginal bleeding (i.e., soaking pad or tampon per hour and present > 6 hours; 1 menstrual cup every 6 hours)    Negative: Pale skin (pallor) of new onset or worsening    Negative: Constant abdominal pain lasting > 2 hours    Negative: Patient sounds very sick or weak to the triager    Negative: Taking Coumadin (warfarin) or other strong blood thinner, or known bleeding disorder (e.g., thrombocytopenia)    Negative: Skin bruises or nosebleed and not caused by an injury    Negative:  Bleeding/spotting after procedure (e.g., biopsy) or pelvic examination (e.g., pap smear) that persists > 3 days    Negative: Patient wants to be seen    Negative: Passed tissue (e.g., gray-white)    Negative: Periods with > 6 soaked pads or tampons per day    Negative: Periods last > 7 days    Negative: Uses menstrual cups and more than 80 ml blood per menstrual period    Negative: Missed period has occurred 2 or more times in the last year and the cause is not known    Negative: Menstrual cycle < 21 days OR > 35 days, and occurs more than two cycles (2 months) this past year    Negative: Bleeding or spotting between regular periods occurs more than two cycles (2 months),    Negative: Bleeding or spotting between regular periods occurs more than two cycles (2 months), and using birth control medicine (e.g., pills, patch, Depo-Provera, Implanon, vaginal ring, Mirena IUD)    Negative: Bleeding or spotting occurs after hysterectomy    Negative: Age > 39 years with irregular or excessive bleeding    Negative: Bleeding or spotting occurs after sex (Exception: first intercourse)    Protocols used: VAGINAL BLEEDING - [id number]

## 2019-09-25 NOTE — TELEPHONE ENCOUNTER
Reason for Call:  Other call back    Detailed comments: Pt called and is requesting to speak with and RN. She has concerns about her period but didn't want to give out details. Please Advise thank you    Phone Number Patient can be reached at: Home number on file [phone] (home)    Best Time: anytime    Can we leave a detailed message on this number? YES    Call taken on 9/25/2019 at 10:36 AM by Annie Mares

## 2019-12-10 ENCOUNTER — OFFICE VISIT (OUTPATIENT)
Dept: FAMILY MEDICINE | Facility: OTHER | Age: 24
End: 2019-12-10
Payer: COMMERCIAL

## 2019-12-10 VITALS
TEMPERATURE: 98.9 F | BODY MASS INDEX: 23 KG/M2 | HEIGHT: 63 IN | OXYGEN SATURATION: 100 % | RESPIRATION RATE: 14 BRPM | DIASTOLIC BLOOD PRESSURE: 60 MMHG | HEART RATE: 118 BPM | WEIGHT: 129.8 LBS | SYSTOLIC BLOOD PRESSURE: 110 MMHG

## 2019-12-10 DIAGNOSIS — K60.2 ANAL FISSURE: ICD-10-CM

## 2019-12-10 DIAGNOSIS — R39.9 SYMPTOMS OF URINARY TRACT INFECTION: ICD-10-CM

## 2019-12-10 DIAGNOSIS — N30.00 ACUTE CYSTITIS WITHOUT HEMATURIA: Primary | ICD-10-CM

## 2019-12-10 DIAGNOSIS — R82.90 NONSPECIFIC FINDING ON EXAMINATION OF URINE: ICD-10-CM

## 2019-12-10 LAB
ALBUMIN UR-MCNC: 100 MG/DL
APPEARANCE UR: ABNORMAL
BACTERIA #/AREA URNS HPF: ABNORMAL /HPF
BILIRUB UR QL STRIP: NEGATIVE
COLOR UR AUTO: YELLOW
GLUCOSE UR STRIP-MCNC: NEGATIVE MG/DL
HGB UR QL STRIP: ABNORMAL
KETONES UR STRIP-MCNC: NEGATIVE MG/DL
LEUKOCYTE ESTERASE UR QL STRIP: ABNORMAL
NITRATE UR QL: POSITIVE
NON-SQ EPI CELLS #/AREA URNS LPF: ABNORMAL /LPF
PH UR STRIP: 7 PH (ref 5–7)
RBC #/AREA URNS AUTO: ABNORMAL /HPF
SOURCE: ABNORMAL
SP GR UR STRIP: 1.02 (ref 1–1.03)
UROBILINOGEN UR STRIP-ACNC: 0.2 EU/DL (ref 0.2–1)
WBC #/AREA URNS AUTO: ABNORMAL /HPF

## 2019-12-10 PROCEDURE — 81001 URINALYSIS AUTO W/SCOPE: CPT | Performed by: PHYSICIAN ASSISTANT

## 2019-12-10 PROCEDURE — 87086 URINE CULTURE/COLONY COUNT: CPT | Performed by: PHYSICIAN ASSISTANT

## 2019-12-10 PROCEDURE — 87088 URINE BACTERIA CULTURE: CPT | Performed by: PHYSICIAN ASSISTANT

## 2019-12-10 PROCEDURE — 99214 OFFICE O/P EST MOD 30 MIN: CPT | Performed by: PHYSICIAN ASSISTANT

## 2019-12-10 PROCEDURE — 87186 SC STD MICRODIL/AGAR DIL: CPT | Performed by: PHYSICIAN ASSISTANT

## 2019-12-10 RX ORDER — CIPROFLOXACIN 500 MG/1
500 TABLET, FILM COATED ORAL 2 TIMES DAILY
Qty: 14 TABLET | Refills: 0 | Status: SHIPPED | OUTPATIENT
Start: 2019-12-10 | End: 2020-05-12

## 2019-12-10 ASSESSMENT — PATIENT HEALTH QUESTIONNAIRE - PHQ9
10. IF YOU CHECKED OFF ANY PROBLEMS, HOW DIFFICULT HAVE THESE PROBLEMS MADE IT FOR YOU TO DO YOUR WORK, TAKE CARE OF THINGS AT HOME, OR GET ALONG WITH OTHER PEOPLE: NOT DIFFICULT AT ALL
SUM OF ALL RESPONSES TO PHQ QUESTIONS 1-9: 10
SUM OF ALL RESPONSES TO PHQ QUESTIONS 1-9: 10

## 2019-12-10 ASSESSMENT — ANXIETY QUESTIONNAIRES
5. BEING SO RESTLESS THAT IT IS HARD TO SIT STILL: SEVERAL DAYS
6. BECOMING EASILY ANNOYED OR IRRITABLE: NEARLY EVERY DAY
GAD7 TOTAL SCORE: 10
2. NOT BEING ABLE TO STOP OR CONTROL WORRYING: SEVERAL DAYS
GAD7 TOTAL SCORE: 10
1. FEELING NERVOUS, ANXIOUS, OR ON EDGE: SEVERAL DAYS
7. FEELING AFRAID AS IF SOMETHING AWFUL MIGHT HAPPEN: MORE THAN HALF THE DAYS
4. TROUBLE RELAXING: SEVERAL DAYS
7. FEELING AFRAID AS IF SOMETHING AWFUL MIGHT HAPPEN: MORE THAN HALF THE DAYS
3. WORRYING TOO MUCH ABOUT DIFFERENT THINGS: SEVERAL DAYS
GAD7 TOTAL SCORE: 10

## 2019-12-10 ASSESSMENT — MIFFLIN-ST. JEOR: SCORE: 1314.64

## 2019-12-10 NOTE — PATIENT INSTRUCTIONS
UTI:  We will call with  Urine culture results when available  Start the Ciprofloxacin today.   Increase water intake  Can try cranberry supplement if needed  Follow-up if not improving over the next 24-48 hours sooner if worsening pain, fever, abdominal pain, etc.     Hemorrhoids/Fissure:  Warm baths can help heal the areas. Even heating pad can be beneficial  Start Miralax 17 grams once daily.   When bowel movements are softer can start to slowly increase small amounts of fiber in diet to maintain normal bowel movements.   Follow-up if not improving

## 2019-12-10 NOTE — LETTER
[address]  [facility] [address]  Phone: [phone]    December 10, 2019        Angella CONTRERAS Lesley  [address] [place] [address]  [facility] [address]          To whom it may concern:    RE: Angella CONTRERAS Lesley    Patient was seen and treated today at our clinic.    Please contact me for questions or concerns.      Sincerely,        Davis Keen PA-C

## 2019-12-11 LAB
BACTERIA SPEC CULT: ABNORMAL
Lab: ABNORMAL
SPECIMEN SOURCE: ABNORMAL

## 2019-12-11 ASSESSMENT — PATIENT HEALTH QUESTIONNAIRE - PHQ9: SUM OF ALL RESPONSES TO PHQ QUESTIONS 1-9: 10

## 2019-12-11 ASSESSMENT — ANXIETY QUESTIONNAIRES: GAD7 TOTAL SCORE: 10

## 2019-12-13 ENCOUNTER — TELEPHONE (OUTPATIENT)
Dept: FAMILY MEDICINE | Facility: OTHER | Age: 24
End: 2019-12-13

## 2019-12-13 NOTE — TELEPHONE ENCOUNTER
----- Message from Davis Keen PA-C sent at 12/13/2019  7:51 AM CST -----  Please call patient.  UC confirmed infection.  The antibiotic she is currently on shown to be intermediate at treating infection, if she is still having symptoms will send over different antibiotic but if symptoms are gone then likely treated appropriately.     Davis Keen PA-C

## 2019-12-13 NOTE — LETTER
[address]  [facility] [address]  [phone]        December 16, 2019    Angella Sutton  [address]  [facility] [address]          Dear Angella,    We have tried to reach you by phone but have been unsuccessful. The urine culture confirmed that you do have an infection. The antibiotic you were treated with shows to be intermediate at treating the infection. If you are still having symptoms another antibiotic will be sent over for you. If your symptoms have cleared then you were likely treated appropriately. Please contact the clinic with any questions or concerns.     Sincerely,        Your [facility] Care Team

## 2020-01-20 NOTE — PROGRESS NOTES
Subjective     Angella Sutton is a 24 year old female who presents to clinic today for the following health issues:    HPI   Concern - anxiety and depression- discuss emotional support animal  Onset: years    Description:   Depression and anxiety for years, has tried several medications, felt they did not help. Would like to discuss getting an emotional support animal (cat)    Intensity:     Progression of Symptoms:  Anxiety is worse    Accompanying Signs & Symptoms:  Feels overwhelmed, crying    Previous history of similar problem:   Yes was previously hospitalized for depression at age 17    Precipitating factors:   Worsened by:     Alleviating factors:  Improved by: none    Therapies Tried and outcome: zolft, prozac  Answers for HPI/ROS submitted by the patient on 1/21/2020   Chronic problems general questions HPI Form  If you checked off any problems, how difficult have these problems made it for you to do your work, take care of things at home, or get along with other people?: Somewhat difficult  PHQ9 TOTAL SCORE: 15  SEEMA 7 TOTAL SCORE: 17    Patient was seen by psychiatry in [facility] hospital one time in 2015.     Patient states she has longstanding history of depression and anxiety she feels this started in middle school she has strong family history on mother side of family of depression. States she has had thoughts of suicide and easily agitated as a very young child this has worsened over time. She states she continues to be easily agitated and irritated easily when she feels overwhelmed. Since young adult she has tried multiple medications but does not feel she responds well to any of them.   She has tried meditating. She does not feel she is able to calm her self down. She will at times us tactile blankets and other to help with this. She would like to get a cat she has picked one out and checked into vet costs as well as food and care costs. She feels this would help her relax. We discussed responsibility  "and she states she feels she has adequate time to care for one. Her mother states would help as well if needed. She lives alone in an apartment. Needs letter for pet.   She works 3-1 pm and some night shifts 40 hours per week.   Denies thoughts of suicide or self harm.    Patient Active Problem List   Diagnosis     Generalized anxiety disorder     Trichotillomania     Other disorder of eating of nonorganic origin     Moderate episode of recurrent major depressive disorder (H)     No past surgical history on file.    Social History     Tobacco Use     Smoking status: Former Smoker     Smokeless tobacco: Former User     Tobacco comment: pt smokes cigarettes on and off   Substance Use Topics     Alcohol use: Yes     Alcohol/week: 0.0 standard drinks     Comment: occ     Family History   Problem Relation Age of Onset     Lupus Maternal Grandmother      Cerebrovascular Disease Paternal Grandmother          No current outpatient medications on file.     Allergies   Allergen Reactions     Azithromycin Swelling     Sulfamethoxazole-Trimethoprim Rash     possible     Recent Labs   Lab Test 03/09/15  0110   ALT 14   CR 0.52   GFRESTIMATED Not Calculated   GFRESTBLACK Not Calculated   POTASSIUM 3.5      BP Readings from Last 3 Encounters:   01/21/20 102/64   12/10/19 110/60   09/17/19 90/54    Wt Readings from Last 3 Encounters:   01/21/20 59.4 kg (131 lb)   12/10/19 58.9 kg (129 lb 12.8 oz)   09/17/19 59 kg (130 lb)                    Reviewed and updated as needed this visit by Provider         Review of Systems   ROS COMP: Constitutional, HEENT, cardiovascular, pulmonary, GI, , musculoskeletal, neuro, skin, endocrine and psych systems are negative, except as otherwise noted.      Objective    /64   Pulse 70   Temp 98.3  F (36.8  C) (Temporal)   Resp 16   Ht 1.613 m (5' 3.5\")   Wt 59.4 kg (131 lb)   LMP 01/16/2020 (Exact Date)   SpO2 100%   BMI 22.84 kg/m    Body mass index is 22.84 kg/m .  Physical Exam "   GENERAL: healthy, alert and no distress  NECK: no adenopathy, no asymmetry, masses, or scars and thyroid normal to palpation  RESP: lungs clear to auscultation - no rales, rhonchi or wheezes  CV: regular rate and rhythm, normal S1 S2, no S3 or S4, no murmur, click or rub, no peripheral edema and peripheral pulses strong  PSYCH: mentation appears normal, affect normal/bright, tearful, judgement and insight intact and appearance well groomed      Assessment & Plan     1. Generalized anxiety disorder      2. Moderate episode of recurrent major depressive disorder (H)    1-2 letter given for emotional support animal. Discussed at length with patient will need to follow up in 6 weeks to see how mood is doing. Declined medication or therapy.          NIMA Perry [facility]

## 2020-01-21 ENCOUNTER — OFFICE VISIT (OUTPATIENT)
Dept: FAMILY MEDICINE | Facility: OTHER | Age: 25
End: 2020-01-21
Payer: COMMERCIAL

## 2020-01-21 VITALS
HEIGHT: 64 IN | DIASTOLIC BLOOD PRESSURE: 64 MMHG | OXYGEN SATURATION: 100 % | WEIGHT: 131 LBS | HEART RATE: 70 BPM | BODY MASS INDEX: 22.36 KG/M2 | RESPIRATION RATE: 16 BRPM | TEMPERATURE: 98.3 F | SYSTOLIC BLOOD PRESSURE: 102 MMHG

## 2020-01-21 DIAGNOSIS — F33.1 MODERATE EPISODE OF RECURRENT MAJOR DEPRESSIVE DISORDER (H): ICD-10-CM

## 2020-01-21 DIAGNOSIS — F41.1 GENERALIZED ANXIETY DISORDER: Primary | ICD-10-CM

## 2020-01-21 PROCEDURE — 99213 OFFICE O/P EST LOW 20 MIN: CPT | Performed by: NURSE PRACTITIONER

## 2020-01-21 ASSESSMENT — MIFFLIN-ST. JEOR: SCORE: 1331.27

## 2020-01-21 ASSESSMENT — ANXIETY QUESTIONNAIRES
7. FEELING AFRAID AS IF SOMETHING AWFUL MIGHT HAPPEN: NEARLY EVERY DAY
5. BEING SO RESTLESS THAT IT IS HARD TO SIT STILL: SEVERAL DAYS
7. FEELING AFRAID AS IF SOMETHING AWFUL MIGHT HAPPEN: NEARLY EVERY DAY
6. BECOMING EASILY ANNOYED OR IRRITABLE: NEARLY EVERY DAY
GAD7 TOTAL SCORE: 17
GAD7 TOTAL SCORE: 17
4. TROUBLE RELAXING: SEVERAL DAYS
3. WORRYING TOO MUCH ABOUT DIFFERENT THINGS: NEARLY EVERY DAY
2. NOT BEING ABLE TO STOP OR CONTROL WORRYING: NEARLY EVERY DAY
GAD7 TOTAL SCORE: 17
1. FEELING NERVOUS, ANXIOUS, OR ON EDGE: NEARLY EVERY DAY

## 2020-01-21 ASSESSMENT — PATIENT HEALTH QUESTIONNAIRE - PHQ9
SUM OF ALL RESPONSES TO PHQ QUESTIONS 1-9: 15
SUM OF ALL RESPONSES TO PHQ QUESTIONS 1-9: 15
10. IF YOU CHECKED OFF ANY PROBLEMS, HOW DIFFICULT HAVE THESE PROBLEMS MADE IT FOR YOU TO DO YOUR WORK, TAKE CARE OF THINGS AT HOME, OR GET ALONG WITH OTHER PEOPLE: SOMEWHAT DIFFICULT

## 2020-01-21 NOTE — LETTER
[facility]  [address]  [place] [address] [phone]          January 21, 2020    Angella Sutton                                                                                                                     [address]  [place] [address]      Dear Housing Authority/Landlord:    Angella Sutton is my patient, and has been under my care since 3/2019.  I am familiar with her medical history and with the functional limitations imposed by her disability. She meets the definition of disability under the American s with Disabilities Act, the Fair Housing Act, and the Rehabilitation Act of 1973.    Due to Angella's history of emotional and physical distress related to depression and anxiety for which she has used tactile therapy with moderate results, we feel an emotional animal would improve her distress by allowing focus and further elevate tactile therapy.  In order to help alleviate these difficulties, and to enhance her ability to live independently and to fully use and enjoy the dwelling unit you own and/or administer, I have prescribed an emotional support animal (CAT). The presence of this animal is necessary for the mental health of Angella because the presence will alleviate some of her  symptoms.     Pet owners often feel needed and responsible, which may stimulate the survival incentive. They feel they need to survive to take care of their pets. Stroking a pet, walking them, and watching a kitten tumble can be an antidote to a foul mood or a frazzling day. Stroking and cuddling pets can have a positive effect on overall health. Research shows that felines/canines can relieve stress and lower blood pressure, and studies have found that the hormone oxytocin is released when we re around pets, triggering feelings of happiness. Cats and dogs can provide love, comfort and confidence to people who are shut in, lonely or sick. Pets such as dogs and cats provide  unconditional, nonjudgmental love and affection.  Pets can shift our narrow focus beyond ourselves, helping us to feel connected to a larger world.  Dogs and cats can give different relief in emotional situations.  Cats give an over sense of calm and relaxation, whereas dogs can sense the need for companionship before a person can. Therefore, Angella  may benefit from having a therapy animal.     Please do not hesitate to call me regarding my prescription of this animal as I feel that this animal will improve her overall health and wellbeing. Angella will continue to be closely monitored during her care for this animal to make sure symptoms are truly improving.     Sincerely,        Jaimie Chanda FINK

## 2020-01-22 ASSESSMENT — ANXIETY QUESTIONNAIRES: GAD7 TOTAL SCORE: 17

## 2020-02-25 NOTE — PROGRESS NOTES
"Angella Sutton is a 24 year old female who is being evaluated via a billable telephone visit.      The patient has been notified of following:     \"This telephone visit will be conducted via a call between you and your physician/provider. We have found that certain health care needs can be provided without the need for a physical exam.  This service lets us provide the care you need with a short phone conversation.  If a prescription is necessary we can send it directly to your pharmacy.  If lab work is needed we can place an order for that and you can then stop by our lab to have the test done at a later time.    If during the course of the call the physician/provider feels a telephone visit is not appropriate, you will not be charged for this service.\"     Consent has been obtained for this service by 1 care team member: yes. See the scanned image in the medical record.    Angella Sutton complains of    Chief Complaint   Patient presents with     MOOD CHANGES     I have reviewed and updated the patient's Past Medical History, Social History, Family History and Medication List.    ALLERGIES  Azithromycin and Sulfamethoxazole-trimethoprim    Annie Mancera MA  (MA signature)    Additional provider notes: patient states her support animal is helping however when travels is having increased anxiety she has used prozac in past with some success and would like to retry this. I reviewed s/e to this medication and uses for anxiety, depression, etc.. she will follow up phone visit in 6 weeks this was scheduled today.       Assessment/Plan:  (F41.1) Generalized anxiety disorder  (primary encounter diagnosis)  Comment: recommend starting Prozac as noted above. Will follow up in 6 weeks.   Plan: FLUoxetine (PROZAC) 20 MG capsule            (F33.1) Moderate episode of recurrent major depressive disorder (H)  Comment:   Plan: FLUoxetine (PROZAC) 20 MG capsule              I have reviewed the note as documented above.  This " accurately captures the substance of my conversation with the patient.      Total time of call between patient and provider was 10 minutes     NIMA Perry CNP

## 2020-03-03 ENCOUNTER — VIRTUAL VISIT (OUTPATIENT)
Dept: FAMILY MEDICINE | Facility: OTHER | Age: 25
End: 2020-03-03
Payer: COMMERCIAL

## 2020-03-03 ENCOUNTER — TELEPHONE (OUTPATIENT)
Dept: FAMILY MEDICINE | Facility: OTHER | Age: 25
End: 2020-03-03

## 2020-03-03 DIAGNOSIS — F41.1 GENERALIZED ANXIETY DISORDER: Primary | ICD-10-CM

## 2020-03-03 DIAGNOSIS — F33.1 MODERATE EPISODE OF RECURRENT MAJOR DEPRESSIVE DISORDER (H): ICD-10-CM

## 2020-03-03 PROCEDURE — 99441 ZZC PHYSICIAN TELEPHONE EVALUATION 5-10 MIN: CPT | Performed by: NURSE PRACTITIONER

## 2020-03-03 ASSESSMENT — ANXIETY QUESTIONNAIRES
IF YOU CHECKED OFF ANY PROBLEMS ON THIS QUESTIONNAIRE, HOW DIFFICULT HAVE THESE PROBLEMS MADE IT FOR YOU TO DO YOUR WORK, TAKE CARE OF THINGS AT HOME, OR GET ALONG WITH OTHER PEOPLE: EXTREMELY DIFFICULT
6. BECOMING EASILY ANNOYED OR IRRITABLE: NEARLY EVERY DAY
5. BEING SO RESTLESS THAT IT IS HARD TO SIT STILL: NEARLY EVERY DAY
7. FEELING AFRAID AS IF SOMETHING AWFUL MIGHT HAPPEN: NEARLY EVERY DAY
1. FEELING NERVOUS, ANXIOUS, OR ON EDGE: NEARLY EVERY DAY
GAD7 TOTAL SCORE: 21
3. WORRYING TOO MUCH ABOUT DIFFERENT THINGS: NEARLY EVERY DAY
2. NOT BEING ABLE TO STOP OR CONTROL WORRYING: NEARLY EVERY DAY

## 2020-03-03 ASSESSMENT — PATIENT HEALTH QUESTIONNAIRE - PHQ9
5. POOR APPETITE OR OVEREATING: NEARLY EVERY DAY
SUM OF ALL RESPONSES TO PHQ QUESTIONS 1-9: 4

## 2020-03-03 NOTE — TELEPHONE ENCOUNTER
Patient is scheduled to see WS today at 1:40p and due to cost, She is wondering if you could do a phone visit instead?  Please advise. -putting it as high priority only because she is scheduled for 1:40

## 2020-03-04 ASSESSMENT — ANXIETY QUESTIONNAIRES: GAD7 TOTAL SCORE: 21

## 2020-04-09 NOTE — PROGRESS NOTES
"Subjective     Angella Sutton is a 24 year old female who is being evaluated via a billable telephone visit.      The patient has been notified of following:     \"This telephone visit will be conducted via a call between you and your physician/provider. We have found that certain health care needs can be provided without the need for a physical exam.  This service lets us provide the care you need with a short phone conversation.  If a prescription is necessary we can send it directly to your pharmacy.  If lab work is needed we can place an order for that and you can then stop by our lab to have the test done at a later time.    Telephone visits are billed at different rates depending on your insurance coverage. During this emergency period, for some insurers they may be billed the same as an in-person visit.  Please reach out to your insurance provider with any questions.    If during the course of the call the physician/provider feels a telephone visit is not appropriate, you will not be charged for this service.\"    Patient has given verbal consent for Telephone visit?  Yes    How would you like to obtain your AVS? Mail a copy    Angella Sutton complains of No chief complaint on file.      ALLERGIES  Azithromycin and Sulfamethoxazole-trimethoprim    Depression and Anxiety Follow-Up    How are you doing with your depression since your last visit? Worsened     How are you doing with your anxiety since your last visit?  Worsened     Are you having other symptoms that might be associated with depression or anxiety? Yes:  panic attacks    Have you had a significant life event? No     Do you have any concerns with your use of alcohol or other drugs? No    Social History     Tobacco Use     Smoking status: Former Smoker     Smokeless tobacco: Former User     Tobacco comment: pt smokes cigarettes on and off   Substance Use Topics     Alcohol use: Yes     Alcohol/week: 0.0 standard drinks     Comment: occ     Drug use: Yes "     Types: Marijuana     PHQ 12/10/2019 1/21/2020 3/3/2020   PHQ-9 Total Score 10 15 4   Q9: Thoughts of better off dead/self-harm past 2 weeks Not at all Not at all Not at all   F/U: Thoughts of suicide or self-harm - - -   F/U: Safety concerns - - -     SEEMA-7 SCORE 12/10/2019 1/21/2020 3/3/2020   Total Score - - -   Total Score 10 (moderate anxiety) 17 (severe anxiety) -   Total Score 10 17 21       In the past two weeks have you had thoughts of suicide or self-harm?  No.    Do you have concerns about your personal safety or the safety of others?   No    Suicide Assessment Five-step Evaluation and Treatment (SAFE-T)      How many servings of fruits and vegetables do you eat daily?  0-1    On average, how many sweetened beverages do you drink each day (Examples: soda, juice, sweet tea, etc.  Do NOT count diet or artificially sweetened beverages)?   0    How many days per week do you exercise enough to make your heart beat faster? 3 or less    How many minutes a day do you exercise enough to make your heart beat faster? 10 - 19    How many days per week do you miss taking your medication? 0    Last encounter started Prozac at 20 mg she states she is tolerating this medication well and is feeling improvement of depression however anxiety is worse.     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 Symptoms at night she is feeling more panicky, with shakiness, this lasts about an hour. She is working 3-11 pm she is very familiar with her job does not feel this is a stressor for her. Denies thoughts of suicide or self harm.     Patient states she has longstanding history of depression and anxiety she feels this started in middle school she has strong family history on mother side of family of depression. States she has had thoughts of suicide and easily agitated as a very young child this  has worsened over time. She states she continues to be easily agitated and irritated easily when she feels overwhelmed. Since young adult she has tried multiple medications but does not feel she responds well to any of them including Effexor XR, Zoloft,       Patient Active Problem List   Diagnosis     Generalized anxiety disorder     Trichotillomania     Other disorder of eating of nonorganic origin     Moderate episode of recurrent major depressive disorder (H)     History reviewed. No pertinent surgical history.    Social History     Tobacco Use     Smoking status: Former Smoker     Smokeless tobacco: Former User     Tobacco comment: pt smokes cigarettes on and off   Substance Use Topics     Alcohol use: Yes     Alcohol/week: 0.0 standard drinks     Comment: occ     Family History   Problem Relation Age of Onset     Lupus Maternal Grandmother      Cerebrovascular Disease Paternal Grandmother          Current Outpatient Medications   Medication Sig Dispense Refill     FLUoxetine (PROZAC) 20 MG capsule Take 1 capsule (20 mg) by mouth daily 60 capsule 0     Allergies   Allergen Reactions     Azithromycin Swelling     Sulfamethoxazole-Trimethoprim Rash     possible     Recent Labs   Lab Test 03/09/15  0110   ALT 14   CR 0.52   GFRESTIMATED Not Calculated   GFRESTBLACK Not Calculated   POTASSIUM 3.5      BP Readings from Last 3 Encounters:   01/21/20 102/64   12/10/19 110/60   09/17/19 90/54    Wt Readings from Last 3 Encounters:   01/21/20 59.4 kg (131 lb)   12/10/19 58.9 kg (129 lb 12.8 oz)   09/17/19 59 kg (130 lb)            Reviewed and updated as needed this visit by Provider         Review of Systems   ROS COMP: Constitutional, HEENT, cardiovascular, pulmonary, GI, , musculoskeletal, neuro, skin, endocrine and psych systems are negative, except as otherwise noted.       Objective   Reported vitals:  There were no vitals taken for this visit.   alert, no distress and cooperative  Psych: Alert and oriented  times 3; coherent speech, normal   rate and volume, able to articulate logical thoughts, able   to abstract reason, no tangential thoughts, no hallucinations   or delusions  Her affect is bright          Assessment/Plan:  1. Generalized anxiety disorder  Discussed increasing Prozac from 20 mg to 30 mg she is tolerating this well.   - FLUoxetine (PROZAC) 20 MG capsule; Take 1 capsule (20 mg) by mouth daily  Dispense: 60 capsule; Refill: 0  - FLUoxetine (PROZAC) 10 MG capsule; Take 1 capsule (10 mg) by mouth daily  Dispense: 60 capsule; Refill: 0    2. Moderate episode of recurrent major depressive disorder (H)  \  - FLUoxetine (PROZAC) 20 MG capsule; Take 1 capsule (20 mg) by mouth daily  Dispense: 60 capsule; Refill: 0  - FLUoxetine (PROZAC) 10 MG capsule; Take 1 capsule (10 mg) by mouth daily  Dispense: 60 capsule; Refill: 0    3. Anxiety attack  Discussed adding Atarax as needed for anxiety attacks as she is having these mainly at night discussed may cause drowsiness should not drive with this medication.   Side effects of medications, proper use, the associated risk/benefits and other options were discussed. Patient understands these risks and agrees to take the medication as instructed.     - hydrOXYzine (ATARAX) 25 MG tablet; Take 1 tablet (25 mg) by mouth every 8 hours as needed for anxiety  Dispense: 30 tablet; Refill: 0    She is not doing therapy at this time she is interested in seeing psychiatry for formal consult and further recommendation for treatment plan. This would be a good plan for her if in 6 weeks continues to have symptoms would recommend psychiatry consult with Dr. Grimes in [place].       Phone call duration:  10 minutes    NIMA Perry CNP

## 2020-04-14 ENCOUNTER — VIRTUAL VISIT (OUTPATIENT)
Dept: FAMILY MEDICINE | Facility: OTHER | Age: 25
End: 2020-04-14
Payer: COMMERCIAL

## 2020-04-14 DIAGNOSIS — F41.1 GENERALIZED ANXIETY DISORDER: ICD-10-CM

## 2020-04-14 DIAGNOSIS — F41.0 ANXIETY ATTACK: Primary | ICD-10-CM

## 2020-04-14 DIAGNOSIS — F33.1 MODERATE EPISODE OF RECURRENT MAJOR DEPRESSIVE DISORDER (H): ICD-10-CM

## 2020-04-14 PROCEDURE — 96127 BRIEF EMOTIONAL/BEHAV ASSMT: CPT | Performed by: NURSE PRACTITIONER

## 2020-04-14 PROCEDURE — 99214 OFFICE O/P EST MOD 30 MIN: CPT | Mod: TEL | Performed by: NURSE PRACTITIONER

## 2020-04-14 RX ORDER — FLUOXETINE 10 MG/1
10 CAPSULE ORAL DAILY
Qty: 60 CAPSULE | Refills: 0 | Status: SHIPPED | OUTPATIENT
Start: 2020-04-14 | End: 2020-05-20

## 2020-04-14 RX ORDER — HYDROXYZINE HYDROCHLORIDE 25 MG/1
25 TABLET, FILM COATED ORAL EVERY 8 HOURS PRN
Qty: 30 TABLET | Refills: 0 | Status: SHIPPED | OUTPATIENT
Start: 2020-04-14 | End: 2020-05-21

## 2020-04-14 ASSESSMENT — PATIENT HEALTH QUESTIONNAIRE - PHQ9
5. POOR APPETITE OR OVEREATING: NEARLY EVERY DAY
SUM OF ALL RESPONSES TO PHQ QUESTIONS 1-9: 22

## 2020-04-14 ASSESSMENT — ANXIETY QUESTIONNAIRES
5. BEING SO RESTLESS THAT IT IS HARD TO SIT STILL: NEARLY EVERY DAY
3. WORRYING TOO MUCH ABOUT DIFFERENT THINGS: NEARLY EVERY DAY
2. NOT BEING ABLE TO STOP OR CONTROL WORRYING: NEARLY EVERY DAY
GAD7 TOTAL SCORE: 21
6. BECOMING EASILY ANNOYED OR IRRITABLE: NEARLY EVERY DAY
1. FEELING NERVOUS, ANXIOUS, OR ON EDGE: NEARLY EVERY DAY
7. FEELING AFRAID AS IF SOMETHING AWFUL MIGHT HAPPEN: NEARLY EVERY DAY
IF YOU CHECKED OFF ANY PROBLEMS ON THIS QUESTIONNAIRE, HOW DIFFICULT HAVE THESE PROBLEMS MADE IT FOR YOU TO DO YOUR WORK, TAKE CARE OF THINGS AT HOME, OR GET ALONG WITH OTHER PEOPLE: EXTREMELY DIFFICULT

## 2020-04-14 NOTE — PATIENT INSTRUCTIONS
Increasing prozac to 30 mg as discussed with addition of atarax for anxiety attacks follow up in 6 weeks for mood management.   If symptoms continue with no improvement will likely recommend psychiatry consult.     Thank you  Jaimie Alexandre CNP

## 2020-04-15 ASSESSMENT — ANXIETY QUESTIONNAIRES: GAD7 TOTAL SCORE: 21

## 2020-05-11 NOTE — PROGRESS NOTES
"Angella Sutton is a 24 year old female who is being evaluated via a billable telephone visit.      The patient has been notified of following:     \"This telephone visit will be conducted via a call between you and your physician/provider. We have found that certain health care needs can be provided without the need for a physical exam.  This service lets us provide the care you need with a short phone conversation.  If a prescription is necessary we can send it directly to your pharmacy.  If lab work is needed we can place an order for that and you can then stop by our lab to have the test done at a later time.    Telephone visits are billed at different rates depending on your insurance coverage. During this emergency period, for some insurers they may be billed the same as an in-person visit.  Please reach out to your insurance provider with any questions.    If during the course of the call the physician/provider feels a telephone visit is not appropriate, you will not be charged for this service.\"    Patient has given verbal consent for Telephone visit?  Yes    What phone number would you like to be contacted at? [phone]    How would you like to obtain your AVS? Mail a copy   [email or web address]      Subjective     Angella Sutton is a 24 year old female who presents to clinic today for the following health issues:    HPI     Needs note for work. Patient needs note for her work stating she is around high risk people so patient can take percausion at work.     Patient reports she needs a note for work today as she would like extra precautions taken for COVID 19. 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 She states her work is willing to make accommodations just needs a letter explaining this.     Patient Active Problem List   Diagnosis     Generalized anxiety disorder     Trichotillomania     Other disorder of eating of nonorganic origin     Moderate episode of " recurrent major depressive disorder (H)     History reviewed. No pertinent surgical history.    Social History     Tobacco Use     Smoking status: Former Smoker     Smokeless tobacco: Former User     Tobacco comment: pt smokes cigarettes on and off   Substance Use Topics     Alcohol use: Yes     Alcohol/week: 0.0 standard drinks     Comment: occ     Family History   Problem Relation Age of Onset     Lupus Maternal Grandmother      Cerebrovascular Disease Paternal Grandmother      Chronic Obstructive Pulmonary Disease Maternal Grandfather          Current Outpatient Medications   Medication Sig Dispense Refill     FLUoxetine (PROZAC) 20 MG capsule Take 1 capsule (20 mg) by mouth daily 60 capsule 0     hydrOXYzine (ATARAX) 25 MG tablet Take 1 tablet (25 mg) by mouth every 8 hours as needed for anxiety 30 tablet 0     FLUoxetine (PROZAC) 10 MG capsule Take 1 capsule (10 mg) by mouth daily (Patient not taking: Reported on 5/12/2020) 60 capsule 0     Allergies   Allergen Reactions     Azithromycin Swelling     Sulfamethoxazole-Trimethoprim Rash     possible       Reviewed and updated as needed this visit by Provider         Review of Systems   Constitutional, HEENT, cardiovascular, pulmonary, gi and gu systems are negative, except as otherwise noted.       Objective   Reported vitals:  There were no vitals taken for this visit.   PSYCH: Alert and oriented times 3; coherent speech, normal   rate and volume, able to articulate logical thoughts, able   to abstract reason, no tangential thoughts, no hallucinations   or delusions  Her affect is normal and pleasant  RESP: No cough, no audible wheezing, able to talk in full sentences  Remainder of exam unable to be completed due to telephone visits    Diagnostic Test Results:  Labs reviewed in Epic  none         Assessment/Plan:  1. Advice Given About Covid-19 Virus by Telephone  Letter written for patient today. She will otherwise work with her employer to make accommodations  to ensure safety for her and her family.     Phone call duration:  5 minutes    Maegan Tatum PA-C

## 2020-05-12 ENCOUNTER — VIRTUAL VISIT (OUTPATIENT)
Dept: FAMILY MEDICINE | Facility: OTHER | Age: 25
End: 2020-05-12
Payer: COMMERCIAL

## 2020-05-12 DIAGNOSIS — Z71.89 ADVICE GIVEN ABOUT COVID-19 VIRUS BY TELEPHONE: Primary | ICD-10-CM

## 2020-05-12 PROCEDURE — 99213 OFFICE O/P EST LOW 20 MIN: CPT | Mod: TEL | Performed by: PHYSICIAN ASSISTANT

## 2020-05-12 NOTE — LETTER
May 12, 2020      RE: Angella Sutton  [id number] [address] [address]       To whom it may concern:    Angella Sutton had a telephone visit with our clinic today. 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 Due to this it is recommended work accommodations be made to decrease her risk of bringing COVID 19 home to her family. This includes excusing her from work until concerns resolve.     Sincerely,      Maegan Tatum PA-C

## 2020-05-14 NOTE — PROGRESS NOTES
"Angella Sutton is a 24 year old female who is being evaluated via a billable video visit.      The patient has been notified of following:     \"This video visit will be conducted via a call between you and your physician/provider. We have found that certain health care needs can be provided without the need for an in-person physical exam.  This service lets us provide the care you need with a video conversation.  If a prescription is necessary we can send it directly to your pharmacy.  If lab work is needed we can place an order for that and you can then stop by our lab to have the test done at a later time.    Video visits are billed at different rates depending on your insurance coverage.  Please reach out to your insurance provider with any questions.    If during the course of the call the physician/provider feels a video visit is not appropriate, you will not be charged for this service.\"    Patient has given verbal consent for Video visit? {YES-NO  Default Yes:4444::\"Yes\"}    How would you like to obtain your AVS? {AVS Preference:319777}    Patient would like the video invitation sent by: {video visit invitation:224527}    Will anyone else be joining your video visit? {:732731}  {If patient encounters technical issues they should call [phone] :976281}    Subjective     Angella Sutton is a 24 year old female who presents today via video visit for the following health issues:    HPI  {SUPERLIST (Optional):126161}  {PEDS Chronic and Acute Problems (Optional):034114}     Video Start Time: {video visit start/end time for provider to select:145632}    {additonal problems for provider to add (Optional):156503}    {HIST REVIEW/ LINKS 2 (Optional):010279}    Reviewed and updated as needed this visit by Provider         Review of Systems   {ROS COMP (Optional):546594}      Objective    There were no vitals taken for this visit.  Estimated body mass index is 22.84 kg/m  as calculated from the following:    Height as " "of 1/21/20: 1.613 m (5' 3.5\").    Weight as of 1/21/20: 59.4 kg (131 lb).  Physical Exam     {video visit exam brief selected:735584::\"GENERAL: Healthy, alert and no distress\",\"EYES: Eyes grossly normal to inspection.  No discharge or erythema, or obvious scleral/conjunctival abnormalities.\",\"RESP: No audible wheeze, cough, or visible cyanosis.  No visible retractions or increased work of breathing.  \",\"SKIN: Visible skin clear. No significant rash, abnormal pigmentation or lesions.\",\"NEURO: Cranial nerves grossly intact.  Mentation and speech appropriate for age.\",\"PSYCH: Mentation appears normal, affect normal/bright, judgement and insight intact, normal speech and appearance well-groomed.\"}      {Diagnostic Test Results (Optional):321556::\"Diagnostic Test Results:\",\"Labs reviewed in Epic\"}        {PROVIDER CHARTING PREFERENCE:074805}      Video-Visit Details    Type of service:  Video Visit    Video End Time:{video visit start/end time for provider to select:615098}    Originating Location (pt. Location): {video visit patient location:178514::\"Home\"}    Distant Location (provider location):  [facility]     Platform used for Video Visit: {Virtual Visit Platforms:360524::\"[employer] Software\"}    No follow-ups on file.       {signature options:373279}      "

## 2020-05-21 ENCOUNTER — VIRTUAL VISIT (OUTPATIENT)
Dept: FAMILY MEDICINE | Facility: OTHER | Age: 25
End: 2020-05-21
Payer: COMMERCIAL

## 2020-05-21 DIAGNOSIS — F41.0 ANXIETY ATTACK: ICD-10-CM

## 2020-05-21 DIAGNOSIS — F41.1 GENERALIZED ANXIETY DISORDER: ICD-10-CM

## 2020-05-21 DIAGNOSIS — F33.1 MODERATE EPISODE OF RECURRENT MAJOR DEPRESSIVE DISORDER (H): ICD-10-CM

## 2020-05-21 PROCEDURE — 99214 OFFICE O/P EST MOD 30 MIN: CPT | Mod: TEL | Performed by: FAMILY MEDICINE

## 2020-05-21 RX ORDER — LORAZEPAM 0.5 MG/1
0.5 TABLET ORAL EVERY 6 HOURS PRN
Qty: 10 TABLET | Refills: 0 | Status: SHIPPED | OUTPATIENT
Start: 2020-05-21 | End: 2020-06-29

## 2020-05-21 RX ORDER — FLUOXETINE 40 MG/1
40 CAPSULE ORAL DAILY
Qty: 30 CAPSULE | Refills: 0 | Status: SHIPPED | OUTPATIENT
Start: 2020-05-21 | End: 2020-06-29

## 2020-05-21 ASSESSMENT — ANXIETY QUESTIONNAIRES
1. FEELING NERVOUS, ANXIOUS, OR ON EDGE: NEARLY EVERY DAY
3. WORRYING TOO MUCH ABOUT DIFFERENT THINGS: NEARLY EVERY DAY
2. NOT BEING ABLE TO STOP OR CONTROL WORRYING: NEARLY EVERY DAY
IF YOU CHECKED OFF ANY PROBLEMS ON THIS QUESTIONNAIRE, HOW DIFFICULT HAVE THESE PROBLEMS MADE IT FOR YOU TO DO YOUR WORK, TAKE CARE OF THINGS AT HOME, OR GET ALONG WITH OTHER PEOPLE: VERY DIFFICULT
5. BEING SO RESTLESS THAT IT IS HARD TO SIT STILL: NEARLY EVERY DAY
6. BECOMING EASILY ANNOYED OR IRRITABLE: NEARLY EVERY DAY
GAD7 TOTAL SCORE: 21
7. FEELING AFRAID AS IF SOMETHING AWFUL MIGHT HAPPEN: NEARLY EVERY DAY

## 2020-05-21 ASSESSMENT — PATIENT HEALTH QUESTIONNAIRE - PHQ9
5. POOR APPETITE OR OVEREATING: NEARLY EVERY DAY
SUM OF ALL RESPONSES TO PHQ QUESTIONS 1-9: 17
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
## 2020-05-22 ASSESSMENT — ANXIETY QUESTIONNAIRES: GAD7 TOTAL SCORE: 21

## 2020-06-15 ENCOUNTER — TELEPHONE (OUTPATIENT)
Dept: FAMILY MEDICINE | Facility: OTHER | Age: 25
End: 2020-06-15

## 2020-06-15 NOTE — TELEPHONE ENCOUNTER
"Was able to get through to patient to check her in for her telephone visit, but patient declined to do telephone visit and wanted to cancel her appointment. Patient states that her last virtual visit was $300 and she does not want to do another one. She is wondering if she can just get her medication refilled. She states \"Tell her that everything is fine\". Informed patient that message will be passed onto provider to review. Please advise.   "

## 2020-06-15 NOTE — TELEPHONE ENCOUNTER
We have to document discussion and improvement for refills. Though with [employer], they have a standard fee that has not been changed by COVID I believe. The consent form on there will make it clearer, but she can register and then consider if this is a more financially feasible way to get her care.    This is what had been previously represented and I believe is still unchanged.    Angella Aguayo,    Thank you for contacting us with your healthcare question/request. Normally this type of assessment is done during an office visit. The good news is, you can have a visit with the provider without traveling to the clinic.    Please start an eVisit so your provider can fully address your needs. If you need a more immediate response you can complete an OnCare visit with another [facility] provider.      eVisit through "[employer]":  o On your smartphone simply click on the eVisit icon within your "[employer] jennyfer.   o From your computer or tablet, log in to your "[employer]" account, click on Visits, select Start a Virtual Visit from the drop down menu, and click on Symptom-Specific eVisit. The provider will respond to your visit within one business day. The cost for an eVisit is:  - $45 for 5-10 min. of provider time  - $65 for 11-20 min. of provider time  - $90 for 21 min. or more of provider time      OnCare: Available 24/7 with a response from an available provider within an hour of completing your questionnaire. Just visit [email or web address].    If you prefer a traditional in person office visit, please schedule through "[employer]" or call us at [phone].    Thank you for choosing us as your partners in health care.      [facility] ENDER Smalls MD

## 2020-06-25 ENCOUNTER — TELEPHONE (OUTPATIENT)
Dept: FAMILY MEDICINE | Facility: OTHER | Age: 25
End: 2020-06-25

## 2020-06-25 DIAGNOSIS — F41.0 ANXIETY ATTACK: ICD-10-CM

## 2020-06-25 DIAGNOSIS — F33.1 MODERATE EPISODE OF RECURRENT MAJOR DEPRESSIVE DISORDER (H): ICD-10-CM

## 2020-06-25 DIAGNOSIS — F41.1 GENERALIZED ANXIETY DISORDER: ICD-10-CM

## 2020-06-25 RX ORDER — FLUOXETINE 40 MG/1
40 CAPSULE ORAL DAILY
Qty: 30 CAPSULE | Refills: 0 | Status: CANCELLED | OUTPATIENT
Start: 2020-06-25

## 2020-06-25 RX ORDER — LORAZEPAM 0.5 MG/1
0.5 TABLET ORAL EVERY 6 HOURS PRN
Qty: 10 TABLET | Refills: 0 | Status: CANCELLED | OUTPATIENT
Start: 2020-06-25

## 2020-06-25 NOTE — TELEPHONE ENCOUNTER
She is talking about the visit. Cheapest is evisit   Last updated costs below.      eVisit through [employer]:  o On your smartphone simply click on the eVisit icon within your [employer] jennyfer.   o From your computer or tablet, log in to your [employer] account, click on Visits, select Start a Virtual Visit from the drop down menu, and click on Symptom-Specific eVisit. The provider will respond to your visit within one business day. The cost for an eVisit is:  - $45 for 5-10 min. of provider time  - $65 for 11-20 min. of provider time  - $90 for 21 min. or more of provider time.

## 2020-06-25 NOTE — TELEPHONE ENCOUNTER
Reason for Call:  Medication or medication refill:    Do you use a [facility]?  Name of the pharmacy and phone number for the current request:  [facility] - [phone]    Name of the medication requested: FLUoxetine (PROZAC) 40 MG capsule and LORazepam (ATIVAN) 0.5 MG tablet     Other request: patient needs refill but wants to know a way to get refills with out being charged 300.00. She is not able to afford that. Please call    Can we leave a detailed message on this number? YES    Phone number patient can be reached at: Home number on file [phone] (home)    Best Time: any    Call taken on 6/25/2020 at 4:24 PM by Rose Ortiz

## 2020-06-26 ENCOUNTER — E-VISIT (OUTPATIENT)
Dept: FAMILY MEDICINE | Facility: OTHER | Age: 25
End: 2020-06-26
Payer: COMMERCIAL

## 2020-06-26 DIAGNOSIS — F33.1 MODERATE EPISODE OF RECURRENT MAJOR DEPRESSIVE DISORDER (H): ICD-10-CM

## 2020-06-26 DIAGNOSIS — F41.0 ANXIETY ATTACK: ICD-10-CM

## 2020-06-26 DIAGNOSIS — F41.1 GENERALIZED ANXIETY DISORDER: ICD-10-CM

## 2020-06-26 PROCEDURE — 99421 OL DIG E/M SVC 5-10 MIN: CPT | Performed by: FAMILY MEDICINE

## 2020-06-26 ASSESSMENT — ANXIETY QUESTIONNAIRES
6. BECOMING EASILY ANNOYED OR IRRITABLE: NEARLY EVERY DAY
5. BEING SO RESTLESS THAT IT IS HARD TO SIT STILL: NEARLY EVERY DAY
4. TROUBLE RELAXING: NEARLY EVERY DAY
GAD7 TOTAL SCORE: 21
GAD7 TOTAL SCORE: 21
2. NOT BEING ABLE TO STOP OR CONTROL WORRYING: NEARLY EVERY DAY
7. FEELING AFRAID AS IF SOMETHING AWFUL MIGHT HAPPEN: NEARLY EVERY DAY
GAD7 TOTAL SCORE: 21
3. WORRYING TOO MUCH ABOUT DIFFERENT THINGS: NEARLY EVERY DAY
7. FEELING AFRAID AS IF SOMETHING AWFUL MIGHT HAPPEN: NEARLY EVERY DAY
1. FEELING NERVOUS, ANXIOUS, OR ON EDGE: NEARLY EVERY DAY

## 2020-06-26 ASSESSMENT — PATIENT HEALTH QUESTIONNAIRE - PHQ9
10. IF YOU CHECKED OFF ANY PROBLEMS, HOW DIFFICULT HAVE THESE PROBLEMS MADE IT FOR YOU TO DO YOUR WORK, TAKE CARE OF THINGS AT HOME, OR GET ALONG WITH OTHER PEOPLE: EXTREMELY DIFFICULT
SUM OF ALL RESPONSES TO PHQ QUESTIONS 1-9: 27
SUM OF ALL RESPONSES TO PHQ QUESTIONS 1-9: 27

## 2020-06-27 ASSESSMENT — ANXIETY QUESTIONNAIRES: GAD7 TOTAL SCORE: 21

## 2020-06-27 ASSESSMENT — PATIENT HEALTH QUESTIONNAIRE - PHQ9: SUM OF ALL RESPONSES TO PHQ QUESTIONS 1-9: 27

## 2020-06-29 RX ORDER — FLUOXETINE 40 MG/1
40 CAPSULE ORAL DAILY
Qty: 90 CAPSULE | Refills: 0 | Status: SHIPPED | OUTPATIENT
Start: 2020-06-29 | End: 2020-09-09

## 2020-06-29 RX ORDER — LORAZEPAM 0.5 MG/1
0.5 TABLET ORAL EVERY 6 HOURS PRN
Qty: 10 TABLET | Refills: 2 | Status: SHIPPED | OUTPATIENT
Start: 2020-06-29 | End: 2020-09-09

## 2020-07-21 ENCOUNTER — MYC MEDICAL ADVICE (OUTPATIENT)
Dept: FAMILY MEDICINE | Facility: OTHER | Age: 25
End: 2020-07-21

## 2020-07-21 NOTE — TELEPHONE ENCOUNTER
RHONDA,   Spoke with patient offered her telephone and video visit she declined she states she will call her work to see if she actually needs a visit, she would like to not have to pay for a visit.  Thanks  Leigh ZHANG (R)

## 2020-07-27 ENCOUNTER — MYC MEDICAL ADVICE (OUTPATIENT)
Dept: FAMILY MEDICINE | Facility: OTHER | Age: 25
End: 2020-07-27

## 2020-07-28 ENCOUNTER — MYC MEDICAL ADVICE (OUTPATIENT)
Dept: FAMILY MEDICINE | Facility: OTHER | Age: 25
End: 2020-07-28

## 2020-07-28 NOTE — TELEPHONE ENCOUNTER
Dr Smalls is not back in the office until August 10th.  Will forward to PAYAL as he did E-visit 6/26/20 with patient.  If PAYAL is not willing to address, will then need to wait for Dr. Smalls to return.  Of note, Dr. Smalls usually recommends that if patient is considered disabled from their condition then could consider a note.

## 2020-07-29 ENCOUNTER — OFFICE VISIT (OUTPATIENT)
Dept: SURGERY | Facility: CLINIC | Age: 25
End: 2020-07-29
Payer: COMMERCIAL

## 2020-07-29 VITALS
SYSTOLIC BLOOD PRESSURE: 92 MMHG | TEMPERATURE: 97.7 F | WEIGHT: 119 LBS | HEIGHT: 64 IN | DIASTOLIC BLOOD PRESSURE: 42 MMHG | BODY MASS INDEX: 20.32 KG/M2

## 2020-07-29 DIAGNOSIS — K59.00 CONSTIPATION, UNSPECIFIED CONSTIPATION TYPE: ICD-10-CM

## 2020-07-29 DIAGNOSIS — K62.5 RECTAL BLEEDING: Primary | ICD-10-CM

## 2020-07-29 PROCEDURE — 99203 OFFICE O/P NEW LOW 30 MIN: CPT | Mod: 25 | Performed by: SPECIALIST

## 2020-07-29 PROCEDURE — 46600 DIAGNOSTIC ANOSCOPY SPX: CPT | Performed by: SPECIALIST

## 2020-07-29 ASSESSMENT — MIFFLIN-ST. JEOR: SCORE: 1276.84

## 2020-07-29 NOTE — LETTER
7/29/2020         RE: Angella Sutton  [address]  [place] [address]        Dear Colleague,    Thank you for referring your patient, Angella Sutton, to the [facility]. Please see a copy of my visit note below.    Consult requested by Maegan Tatum    Reason for consultation - rectal bleeding    HPI:   Patient is a 24-year-old white female presenting with a many year history of rectal bleeding.  It initially started when she would wipe but now she sees it in the bowl.  She states it started in childhood and has had also had chronic constipation over that time.  She tried MiraLAX a year ago but it did not seem to help.  She has not tried any other bowel regimens.  She also reports some occasional pain and a shards of glass sensation with bowel movements.  She was told by her PCP she might have internal hemorrhoids.  She states she does drink plenty water every day but admits her fiber intake may be inadequate.  There is no family history of familial polyposis or colon cancer.  She now presents to me for possible internal hemorrhoids.    No past medical history on file.    No past surgical history on file.    Current Outpatient Medications   Medication     FLUoxetine (PROZAC) 40 MG capsule     LORazepam (ATIVAN) 0.5 MG tablet     No current facility-administered medications for this visit.         Allergies   Allergen Reactions     Azithromycin Swelling     Sulfamethoxazole-Trimethoprim Rash     possible     Social History     Socioeconomic History     Marital status: Single     Spouse name: Not on file     Number of children: Not on file     Years of education: Not on file     Highest education level: Not on file   Occupational History     Not on file   Social Needs     Financial resource strain: Not on file     Food insecurity     Worry: Not on file     Inability: Not on file     Transportation needs     Medical: Not on file     Non-medical: Not on file   Tobacco Use     Smoking status:  Former Smoker     Smokeless tobacco: Former User     Tobacco comment: pt smokes cigarettes on and off   Substance and Sexual Activity     Alcohol use: Yes     Alcohol/week: 0.0 standard drinks     Comment: 0-1 per month      Drug use: Yes     Types: Marijuana     Sexual activity: Not Currently     Partners: Female     Birth control/protection: None   Lifestyle     Physical activity     Days per week: Not on file     Minutes per session: Not on file     Stress: Not on file   Relationships     Social connections     Talks on phone: Not on file     Gets together: Not on file     Attends Ep[employer] service: Not on file     Active member of club or organization: Not on file     Attends meetings of clubs or organizations: Not on file     Relationship status: Not on file     Intimate partner violence     Fear of current or ex partner: Not on file     Emotionally abused: Not on file     Physically abused: Not on file     Forced sexual activity: Not on file   Other Topics Concern     Parent/sibling w/ CABG, MI or angioplasty before 65F 55M? Not Asked   Social History Narrative     Not on file     Family History   Problem Relation Age of Onset     Lupus Maternal Grandmother      Cerebrovascular Disease Paternal Grandmother      Chronic Obstructive Pulmonary Disease Maternal Grandfather       ROS: 10 point ROS neg other than the symptoms noted above in the HPI.    PE:  B/P: 92/42, T: 97.7, P: Data Unavailable, R: Data Unavailable  General: well developed, well nourished Wf who appears her stated age  HEENT: NC/AT, EOMI, (-)icterus, (-)injection  Neck: Supple, No JVD  Chest: CTA  Heart: S1, S2, (-)m/r/g  Abd: Soft, non tender, non distended, non tender, no masses  Rectal: No masses, no hemorrhoids  Anoscopy: No masses.  Healed post fissure.   Minimal int hemorrhoids.  - no obvious source of bleeding.  Ext; Warm, no edema  Psych: AAOx3  Neuro: No focal deficits      Impression/plan:  This is a 24-year-old with rectal bleeding of  unclear etiology.  On anoscopy there only minimal internal hemorrhoids and evidence of an old healed fissure.  No obvious source for the daily bleeding she reports.  I discussed these findings with the patient she expressed understanding.  After discussion the patient the plan at this time is start on a daily stool softener (colace) and have her drink plenty of water and take plenty of fiber.  I am hoping treating the constipation will relieve the symptoms.  A colonoscopy will also be performed to rule out colonic pathology.   The procedure, risks, benefits, and alternatives were discussed and the patient agrees to proceed.  She will be scheduled the near future.      Stevan Stovall MD, FACS      Again, thank you for allowing me to participate in the care of your patient.        Sincerely,        Stevan Stovall MD

## 2020-07-29 NOTE — PROGRESS NOTES
Consult requested by Maegan Tatum    Reason for consultation - rectal bleeding    HPI:   Patient is a 24-year-old white female presenting with a many year history of rectal bleeding.  It initially started when she would wipe but now she sees it in the bowl.  She states it started in childhood and has had also had chronic constipation over that time.  She tried MiraLAX a year ago but it did not seem to help.  She has not tried any other bowel regimens.  She also reports some occasional pain and a shards of glass sensation with bowel movements.  She was told by her PCP she might have internal hemorrhoids.  She states she does drink plenty water every day but admits her fiber intake may be inadequate.  There is no family history of familial polyposis or colon cancer.  She now presents to me for possible internal hemorrhoids.    No past medical history on file.    No past surgical history on file.    Current Outpatient Medications   Medication     FLUoxetine (PROZAC) 40 MG capsule     LORazepam (ATIVAN) 0.5 MG tablet     No current facility-administered medications for this visit.         Allergies   Allergen Reactions     Azithromycin Swelling     Sulfamethoxazole-Trimethoprim Rash     possible     Social History     Socioeconomic History     Marital status: Single     Spouse name: Not on file     Number of children: Not on file     Years of education: Not on file     Highest education level: Not on file   Occupational History     Not on file   Social Needs     Financial resource strain: Not on file     Food insecurity     Worry: Not on file     Inability: Not on file     Transportation needs     Medical: Not on file     Non-medical: Not on file   Tobacco Use     Smoking status: Former Smoker     Smokeless tobacco: Former User     Tobacco comment: pt smokes cigarettes on and off   Substance and Sexual Activity     Alcohol use: Yes     Alcohol/week: 0.0 standard drinks     Comment: 0-1 per month      Drug use: Yes      Types: Marijuana     Sexual activity: Not Currently     Partners: Female     Birth control/protection: None   Lifestyle     Physical activity     Days per week: Not on file     Minutes per session: Not on file     Stress: Not on file   Relationships     Social connections     Talks on phone: Not on file     Gets together: Not on file     Attends Confucianist service: Not on file     Active member of club or organization: Not on file     Attends meetings of clubs or organizations: Not on file     Relationship status: Not on file     Intimate partner violence     Fear of current or ex partner: Not on file     Emotionally abused: Not on file     Physically abused: Not on file     Forced sexual activity: Not on file   Other Topics Concern     Parent/sibling w/ CABG, MI or angioplasty before 65F 55M? Not Asked   Social History Narrative     Not on file     Family History   Problem Relation Age of Onset     Lupus Maternal Grandmother      Cerebrovascular Disease Paternal Grandmother      Chronic Obstructive Pulmonary Disease Maternal Grandfather       ROS: 10 point ROS neg other than the symptoms noted above in the HPI.    PE:  B/P: 92/42, T: 97.7, P: Data Unavailable, R: Data Unavailable  General: well developed, well nourished Wf who appears her stated age  HEENT: NC/AT, EOMI, (-)icterus, (-)injection  Neck: Supple, No JVD  Chest: CTA  Heart: S1, S2, (-)m/r/g  Abd: Soft, non tender, non distended, non tender, no masses  Rectal: No masses, no hemorrhoids  Anoscopy: No masses.  Healed post fissure.   Minimal int hemorrhoids.  - no obvious source of bleeding.  Ext; Warm, no edema  Psych: AAOx3  Neuro: No focal deficits      Impression/plan:  This is a 24-year-old with rectal bleeding of unclear etiology.  On anoscopy there only minimal internal hemorrhoids and evidence of an old healed fissure.  No obvious source for the daily bleeding she reports.  I discussed these findings with the patient she expressed understanding.   After discussion the patient the plan at this time is start on a daily stool softener (colace) and have her drink plenty of water and take plenty of fiber.  I am hoping treating the constipation will relieve the symptoms.  A colonoscopy will also be performed to rule out colonic pathology.   The procedure, risks, benefits, and alternatives were discussed and the patient agrees to proceed.  She will be scheduled the near future.      Stevan Stovall MD, FACS

## 2020-08-03 ENCOUNTER — TELEPHONE (OUTPATIENT)
Dept: SURGERY | Facility: CLINIC | Age: 25
End: 2020-08-03

## 2020-08-03 NOTE — TELEPHONE ENCOUNTER
Left message for patient to return call to schedule EGD/colonoscopy. If Amrita or Angella are not available, please transfer to same day surgery

## 2020-08-05 NOTE — TELEPHONE ENCOUNTER
Left message for patient to return call to schedule colonoscopy or EGD. If Angella or Amrita are unavailable, please transfer to the surgery center.

## 2020-09-09 ENCOUNTER — OFFICE VISIT (OUTPATIENT)
Dept: FAMILY MEDICINE | Facility: OTHER | Age: 25
End: 2020-09-09
Payer: COMMERCIAL

## 2020-09-09 VITALS
BODY MASS INDEX: 22.32 KG/M2 | SYSTOLIC BLOOD PRESSURE: 98 MMHG | TEMPERATURE: 99 F | DIASTOLIC BLOOD PRESSURE: 60 MMHG | RESPIRATION RATE: 14 BRPM | HEART RATE: 68 BPM | HEIGHT: 63 IN | WEIGHT: 126 LBS

## 2020-09-09 DIAGNOSIS — Z23 NEED FOR PROPHYLACTIC VACCINATION WITH TETANUS-DIPHTHERIA (TD): ICD-10-CM

## 2020-09-09 DIAGNOSIS — N89.8 VAGINAL DISCHARGE: ICD-10-CM

## 2020-09-09 DIAGNOSIS — Z11.3 SCREENING FOR STDS (SEXUALLY TRANSMITTED DISEASES): ICD-10-CM

## 2020-09-09 DIAGNOSIS — B96.89 BACTERIAL VAGINOSIS: Primary | ICD-10-CM

## 2020-09-09 DIAGNOSIS — N76.0 BACTERIAL VAGINOSIS: Primary | ICD-10-CM

## 2020-09-09 LAB
SPECIMEN SOURCE: ABNORMAL
WET PREP SPEC: ABNORMAL

## 2020-09-09 PROCEDURE — 87491 CHLMYD TRACH DNA AMP PROBE: CPT | Performed by: PHYSICIAN ASSISTANT

## 2020-09-09 PROCEDURE — 90471 IMMUNIZATION ADMIN: CPT | Performed by: PHYSICIAN ASSISTANT

## 2020-09-09 PROCEDURE — 86706 HEP B SURFACE ANTIBODY: CPT | Performed by: PHYSICIAN ASSISTANT

## 2020-09-09 PROCEDURE — 90714 TD VACC NO PRESV 7 YRS+ IM: CPT | Performed by: PHYSICIAN ASSISTANT

## 2020-09-09 PROCEDURE — 86780 TREPONEMA PALLIDUM: CPT | Performed by: PHYSICIAN ASSISTANT

## 2020-09-09 PROCEDURE — 36415 COLL VENOUS BLD VENIPUNCTURE: CPT | Performed by: PHYSICIAN ASSISTANT

## 2020-09-09 PROCEDURE — 87210 SMEAR WET MOUNT SALINE/INK: CPT | Performed by: PHYSICIAN ASSISTANT

## 2020-09-09 PROCEDURE — 86803 HEPATITIS C AB TEST: CPT | Performed by: PHYSICIAN ASSISTANT

## 2020-09-09 PROCEDURE — 87389 HIV-1 AG W/HIV-1&-2 AB AG IA: CPT | Performed by: PHYSICIAN ASSISTANT

## 2020-09-09 PROCEDURE — 87340 HEPATITIS B SURFACE AG IA: CPT | Performed by: PHYSICIAN ASSISTANT

## 2020-09-09 PROCEDURE — 99213 OFFICE O/P EST LOW 20 MIN: CPT | Mod: 25 | Performed by: PHYSICIAN ASSISTANT

## 2020-09-09 PROCEDURE — 87591 N.GONORRHOEAE DNA AMP PROB: CPT | Performed by: PHYSICIAN ASSISTANT

## 2020-09-09 RX ORDER — METRONIDAZOLE 500 MG/1
500 TABLET ORAL 2 TIMES DAILY
Qty: 14 TABLET | Refills: 0 | Status: SHIPPED | OUTPATIENT
Start: 2020-09-09 | End: 2020-09-16

## 2020-09-09 ASSESSMENT — MIFFLIN-ST. JEOR: SCORE: 1289.27

## 2020-09-09 NOTE — NURSING NOTE
Prior to immunization administration, verified patients identity using patient s name and date of birth. Please see Immunization Activity for additional information.     Screening Questionnaire for Adult Immunization    Are you sick today?   No   Do you have allergies to medications, food, a vaccine component or latex?   Yes   Have you ever had a serious reaction after receiving a vaccination?   No   Do you have a long-term health problem with heart, lung, kidney, or metabolic disease (e.g., diabetes), asthma, a blood disorder, no spleen, complement component deficiency, a cochlear implant, or a spinal fluid leak?  Are you on long-term aspirin therapy?   No   Do you have cancer, leukemia, HIV/AIDS, or any other immune system problem?   No   Do you have a parent, brother, or sister with an immune system problem?   No   In the past 3 months, have you taken medications that affect  your immune system, such as prednisone, other steroids, or anticancer drugs; drugs for the treatment of rheumatoid arthritis, Crohn s disease, or psoriasis; or have you had radiation treatments?   No   Have you had a seizure, or a brain or other nervous system problem?   No   During the past year, have you received a transfusion of blood or blood    products, or been given immune (gamma) globulin or antiviral drug?   No   For women: Are you pregnant or is there a chance you could become       pregnant during the next month?   No   Have you received any vaccinations in the past 4 weeks?   No     Immunization questionnaire was positive for at least one answer.  Notified provider.        Per orders of Davis Keen PA-C, injection of td given by Dot Garcia CMA. Patient instructed to remain in clinic for 15 minutes afterwards, and to report any adverse reaction to me immediately.       Screening performed by Dot Garcia CMA on 9/9/2020 at 5:59 PM.

## 2020-09-09 NOTE — PROGRESS NOTES
"Subjective     Angella Sutton is a 25 year old female who presents to clinic today for the following health issues:    HPI       Vaginal Symptoms  Onset/Duration: 9/7/2020  Description:  Vaginal Discharge: none   Itching (Pruritis): YES- extreme  Burning sensation:  YES  Odor: no  Accompanying Signs & Symptoms:  Urinary symptoms: no  Abdominal pain: no  Fever: no  History:   Sexually active: YES  New Partner: YES  Possibility of Pregnancy:  No but not on birth control  Recent antibiotic use: no  Previous vaginitis issues: YES  Precipitating or alleviating factors: lubricant   Therapies tried and outcome: Monistat       - No urinary symptoms.   - No known exposure to STDs but admits doesn't use protection, would like screening done today.   - Tried OTC dose of monistat without much relief.   - Has had vaginal infection in the past similar symptoms.     Review of Systems   Constitutional, HEENT, cardiovascular, pulmonary, gi and gu systems are negative, except as otherwise noted.      Objective    BP 98/60   Pulse 68   Temp 99  F (37.2  C)   Resp 14   Ht 1.59 m (5' 2.6\")   Wt 57.2 kg (126 lb)   LMP 08/15/2020   Breastfeeding No   BMI 22.61 kg/m    Body mass index is 22.61 kg/m .  Physical Exam   GENERAL: healthy, alert and no distress  RESP: lungs clear to auscultation - no rales, rhonchi or wheezes  CV: regular rate and rhythm, normal S1 S2, no S3 or S4, no murmur, click or rub, no peripheral edema and peripheral pulses strong  MS: no gross musculoskeletal defects noted, no edema  PSYCH: mentation appears normal, affect normal/bright    Results for orders placed or performed in visit on 09/09/20 (from the past 24 hour(s))   Wet prep    Specimen: Vagina   Result Value Ref Range    Specimen Description Vagina     Wet Prep No Trichomonas seen     Wet Prep Clue cells seen  Few   (A)     Wet Prep No yeast seen     Wet Prep WBC'S seen  Few              Assessment & Plan     Angella was seen today for uti.    Diagnoses " and all orders for this visit:    Bacterial vaginosis  -     metroNIDAZOLE (FLAGYL) 500 MG tablet; Take 1 tablet (500 mg) by mouth 2 times daily for 7 days    Screening for STDs (sexually transmitted diseases)  -     Treponema Abs w Reflex to RPR and Titer  -     Hepatitis C antibody  -     HIV Antigen Antibody Combo  -     Hepatitis B surface antigen  -     Hepatitis B Surface Antibody    Vaginal discharge  -     NEISSERIA GONORRHOEA PCR  -     CHLAMYDIA TRACHOMATIS PCR  -     Wet prep    Need for prophylactic vaccination with tetanus-diphtheria (Td)  -     TD PRESERV FREE, IM (7+ YRS)           Vaginal symptoms:  - Her Wet prep was positive for clue cells.   - Will treat with Metronidazole.  Discussed side effects, avoid alcohol.   - Follow-up if not improving.     Screening for STDs  Encouraged condom use and regular STD screening  Updated immunization.     Return in about 1 week (around 9/16/2020) for If not improving, sooner if worse or new concerns.    Options for treatment and follow-up care were reviewed with the patient and/or guardian. Patient and/or guardian engaged in the decision making process and verbalized understanding of the options discussed and agreed with the final plan.     Davis Keen PA-C  [facility]

## 2020-09-10 LAB
C TRACH DNA SPEC QL NAA+PROBE: NEGATIVE
HBV SURFACE AB SERPL IA-ACNC: 25.44 M[IU]/ML
HBV SURFACE AG SERPL QL IA: NONREACTIVE
HCV AB SERPL QL IA: NONREACTIVE
HIV 1+2 AB+HIV1 P24 AG SERPL QL IA: NONREACTIVE
N GONORRHOEA DNA SPEC QL NAA+PROBE: NEGATIVE
SPECIMEN SOURCE: NORMAL
SPECIMEN SOURCE: NORMAL
T PALLIDUM AB SER QL: NONREACTIVE

## 2020-09-27 ENCOUNTER — ANESTHESIA (OUTPATIENT)
Dept: SURGERY | Facility: CLINIC | Age: 25
End: 2020-09-27
Payer: COMMERCIAL

## 2020-09-27 ENCOUNTER — APPOINTMENT (OUTPATIENT)
Dept: ULTRASOUND IMAGING | Facility: CLINIC | Age: 25
End: 2020-09-27
Attending: NURSE PRACTITIONER
Payer: COMMERCIAL

## 2020-09-27 ENCOUNTER — ANESTHESIA EVENT (OUTPATIENT)
Dept: SURGERY | Facility: CLINIC | Age: 25
End: 2020-09-27
Payer: COMMERCIAL

## 2020-09-27 ENCOUNTER — HOSPITAL ENCOUNTER (EMERGENCY)
Facility: CLINIC | Age: 25
Discharge: HOME | End: 2020-09-28
Attending: NURSE PRACTITIONER | Admitting: OBSTETRICS & GYNECOLOGY
Payer: COMMERCIAL

## 2020-09-27 DIAGNOSIS — O00.90 ECTOPIC PREGNANCY: ICD-10-CM

## 2020-09-27 DIAGNOSIS — N39.0 URINARY TRACT INFECTION IN FEMALE: ICD-10-CM

## 2020-09-27 DIAGNOSIS — O00.201 ECTOPIC PREGNANCY OF RIGHT OVARY: ICD-10-CM

## 2020-09-27 LAB
ABO + RH BLD: NORMAL
ABO + RH BLD: NORMAL
ALBUMIN SERPL-MCNC: 3.8 G/DL (ref 3.4–5)
ALBUMIN UR-MCNC: NEGATIVE MG/DL
ALP SERPL-CCNC: 58 U/L (ref 40–150)
ALT SERPL W P-5'-P-CCNC: 18 U/L (ref 0–50)
ANION GAP SERPL CALCULATED.3IONS-SCNC: 6 MMOL/L (ref 3–14)
APPEARANCE UR: ABNORMAL
AST SERPL W P-5'-P-CCNC: 18 U/L (ref 0–45)
B-HCG SERPL-ACNC: 7412 IU/L (ref 0–5)
BACTERIA #/AREA URNS HPF: ABNORMAL /HPF
BASOPHILS # BLD AUTO: 0 10E9/L (ref 0–0.2)
BASOPHILS NFR BLD AUTO: 0.3 %
BILIRUB SERPL-MCNC: 0.3 MG/DL (ref 0.2–1.3)
BILIRUB UR QL STRIP: NEGATIVE
BLD GP AB SCN SERPL QL: NORMAL
BLOOD BANK CMNT PATIENT-IMP: NORMAL
BUN SERPL-MCNC: 8 MG/DL (ref 7–30)
CALCIUM SERPL-MCNC: 9.3 MG/DL (ref 8.5–10.1)
CHLORIDE SERPL-SCNC: 107 MMOL/L (ref 94–109)
CO2 SERPL-SCNC: 25 MMOL/L (ref 20–32)
COLOR UR AUTO: YELLOW
CREAT SERPL-MCNC: 0.57 MG/DL (ref 0.52–1.04)
DIFFERENTIAL METHOD BLD: NORMAL
EOSINOPHIL NFR BLD AUTO: 1.3 %
ERYTHROCYTE [DISTWIDTH] IN BLOOD BY AUTOMATED COUNT: 12.6 % (ref 10–15)
GFR SERPL CREATININE-BSD FRML MDRD: >90 ML/MIN/{1.73_M2}
GLUCOSE SERPL-MCNC: 88 MG/DL (ref 70–99)
GLUCOSE UR STRIP-MCNC: NEGATIVE MG/DL
HCG UR QL: POSITIVE
HCT VFR BLD AUTO: 35.3 % (ref 35–47)
HGB BLD-MCNC: 11.9 G/DL (ref 11.7–15.7)
HGB UR QL STRIP: NEGATIVE
IMM GRANULOCYTES # BLD: 0.1 10E9/L (ref 0–0.4)
IMM GRANULOCYTES NFR BLD: 0.5 %
KETONES UR STRIP-MCNC: 20 MG/DL
LEUKOCYTE ESTERASE UR QL STRIP: ABNORMAL
LYMPHOCYTES # BLD AUTO: 1.4 10E9/L (ref 0.8–5.3)
LYMPHOCYTES NFR BLD AUTO: 14.1 %
MCH RBC QN AUTO: 31.1 PG (ref 26.5–33)
MCHC RBC AUTO-ENTMCNC: 33.7 G/DL (ref 31.5–36.5)
MCV RBC AUTO: 92 FL (ref 78–100)
MONOCYTES # BLD AUTO: 0.6 10E9/L (ref 0–1.3)
MONOCYTES NFR BLD AUTO: 5.4 %
MUCOUS THREADS #/AREA URNS LPF: PRESENT /LPF
NEUTROPHILS # BLD AUTO: 8 10E9/L (ref 1.6–8.3)
NEUTROPHILS NFR BLD AUTO: 78.4 %
NITRATE UR QL: NEGATIVE
NRBC # BLD AUTO: 0 10*3/UL
NRBC BLD AUTO-RTO: 0 /100
PH UR STRIP: 6 PH (ref 5–7)
PLATELET # BLD AUTO: 320 10E9/L (ref 150–450)
POTASSIUM SERPL-SCNC: 4 MMOL/L (ref 3.4–5.3)
PROT SERPL-MCNC: 7 G/DL (ref 6.8–8.8)
RBC # BLD AUTO: 3.83 10E12/L (ref 3.8–5.2)
RBC #/AREA URNS AUTO: 2 /HPF (ref 0–2)
SODIUM SERPL-SCNC: 138 MMOL/L (ref 133–144)
SOURCE: ABNORMAL
SP GR UR STRIP: 1.02 (ref 1–1.03)
SPECIMEN EXP DATE BLD: NORMAL
SQUAMOUS #/AREA URNS AUTO: 12 /HPF (ref 0–1)
UROBILINOGEN UR STRIP-MCNC: 0 MG/DL (ref 0–2)
WBC # BLD AUTO: 10.2 10E9/L (ref 4–11)
WBC #/AREA URNS AUTO: 20 /HPF (ref 0–5)

## 2020-09-27 PROCEDURE — 84702 CHORIONIC GONADOTROPIN TEST: CPT | Performed by: NURSE PRACTITIONER

## 2020-09-27 PROCEDURE — 87591 N.GONORRHOEAE DNA AMP PROB: CPT | Performed by: NURSE PRACTITIONER

## 2020-09-27 PROCEDURE — 96365 THER/PROPH/DIAG IV INF INIT: CPT | Performed by: NURSE PRACTITIONER

## 2020-09-27 PROCEDURE — 85025 COMPLETE CBC W/AUTO DIFF WBC: CPT | Performed by: NURSE PRACTITIONER

## 2020-09-27 PROCEDURE — 88305 TISSUE EXAM BY PATHOLOGIST: CPT | Mod: 26 | Performed by: OBSTETRICS & GYNECOLOGY

## 2020-09-27 PROCEDURE — C9803 HOPD COVID-19 SPEC COLLECT: HCPCS

## 2020-09-27 PROCEDURE — 25000128 H RX IP 250 OP 636: Performed by: NURSE ANESTHETIST, CERTIFIED REGISTERED

## 2020-09-27 PROCEDURE — 27210794 ZZH OR GENERAL SUPPLY STERILE: Performed by: OBSTETRICS & GYNECOLOGY

## 2020-09-27 PROCEDURE — 36000058 ZZH SURGERY LEVEL 3 EA 15 ADDTL MIN: Performed by: OBSTETRICS & GYNECOLOGY

## 2020-09-27 PROCEDURE — 25800030 ZZH RX IP 258 OP 636: Performed by: NURSE ANESTHETIST, CERTIFIED REGISTERED

## 2020-09-27 PROCEDURE — 71000014 ZZH RECOVERY PHASE 1 LEVEL 2 FIRST HR: Performed by: OBSTETRICS & GYNECOLOGY

## 2020-09-27 PROCEDURE — 86850 RBC ANTIBODY SCREEN: CPT | Performed by: NURSE PRACTITIONER

## 2020-09-27 PROCEDURE — 37000009 ZZH ANESTHESIA TECHNICAL FEE, EACH ADDTL 15 MIN: Performed by: OBSTETRICS & GYNECOLOGY

## 2020-09-27 PROCEDURE — 99285 EMERGENCY DEPT VISIT HI MDM: CPT | Mod: 25 | Performed by: NURSE PRACTITIONER

## 2020-09-27 PROCEDURE — 25000125 ZZHC RX 250: Performed by: NURSE ANESTHETIST, CERTIFIED REGISTERED

## 2020-09-27 PROCEDURE — 25000128 H RX IP 250 OP 636: Performed by: NURSE PRACTITIONER

## 2020-09-27 PROCEDURE — 36000056 ZZH SURGERY LEVEL 3 1ST 30 MIN: Performed by: OBSTETRICS & GYNECOLOGY

## 2020-09-27 PROCEDURE — 86901 BLOOD TYPING SEROLOGIC RH(D): CPT | Performed by: NURSE PRACTITIONER

## 2020-09-27 PROCEDURE — 81025 URINE PREGNANCY TEST: CPT | Performed by: NURSE PRACTITIONER

## 2020-09-27 PROCEDURE — U0003 INFECTIOUS AGENT DETECTION BY NUCLEIC ACID (DNA OR RNA); SEVERE ACUTE RESPIRATORY SYNDROME CORONAVIRUS 2 (SARS-COV-2) (CORONAVIRUS DISEASE [COVID-19]), AMPLIFIED PROBE TECHNIQUE, MAKING USE OF HIGH THROUGHPUT TECHNOLOGIES AS DESCRIBED BY CMS-2020-01-R: HCPCS | Performed by: NURSE PRACTITIONER

## 2020-09-27 PROCEDURE — 80053 COMPREHEN METABOLIC PANEL: CPT | Performed by: NURSE PRACTITIONER

## 2020-09-27 PROCEDURE — 88305 TISSUE EXAM BY PATHOLOGIST: CPT | Performed by: OBSTETRICS & GYNECOLOGY

## 2020-09-27 PROCEDURE — 76817 TRANSVAGINAL US OBSTETRIC: CPT

## 2020-09-27 PROCEDURE — 87491 CHLMYD TRACH DNA AMP PROBE: CPT | Performed by: NURSE PRACTITIONER

## 2020-09-27 PROCEDURE — 81001 URINALYSIS AUTO W/SCOPE: CPT | Performed by: NURSE PRACTITIONER

## 2020-09-27 PROCEDURE — 86900 BLOOD TYPING SEROLOGIC ABO: CPT | Performed by: NURSE PRACTITIONER

## 2020-09-27 PROCEDURE — 71000027 ZZH RECOVERY PHASE 2 EACH 15 MINS: Performed by: OBSTETRICS & GYNECOLOGY

## 2020-09-27 PROCEDURE — 25000564 ZZH DESFLURANE, EA 15 MIN: Performed by: OBSTETRICS & GYNECOLOGY

## 2020-09-27 PROCEDURE — 87086 URINE CULTURE/COLONY COUNT: CPT | Performed by: NURSE PRACTITIONER

## 2020-09-27 PROCEDURE — 37000008 ZZH ANESTHESIA TECHNICAL FEE, 1ST 30 MIN: Performed by: OBSTETRICS & GYNECOLOGY

## 2020-09-27 RX ORDER — FENTANYL CITRATE 50 UG/ML
INJECTION, SOLUTION INTRAMUSCULAR; INTRAVENOUS PRN
Status: DISCONTINUED | OUTPATIENT
Start: 2020-09-27 | End: 2020-09-28

## 2020-09-27 RX ORDER — ONDANSETRON 2 MG/ML
INJECTION INTRAMUSCULAR; INTRAVENOUS PRN
Status: DISCONTINUED | OUTPATIENT
Start: 2020-09-27 | End: 2020-09-28

## 2020-09-27 RX ORDER — OXYCODONE AND ACETAMINOPHEN 5; 325 MG/1; MG/1
1-2 TABLET ORAL EVERY 4 HOURS PRN
Qty: 10 TABLET | Refills: 0 | Status: SHIPPED | OUTPATIENT
Start: 2020-09-27 | End: 2021-08-23

## 2020-09-27 RX ORDER — CEFDINIR 300 MG/1
300 CAPSULE ORAL 2 TIMES DAILY
Qty: 20 CAPSULE | Refills: 0 | Status: SHIPPED | OUTPATIENT
Start: 2020-09-27 | End: 2020-10-07

## 2020-09-27 RX ORDER — NORETHINDRONE ACETATE AND ETHINYL ESTRADIOL .02; 1 MG/1; MG/1
1 TABLET ORAL DAILY
Qty: 30 TABLET | Refills: 0 | Status: SHIPPED | OUTPATIENT
Start: 2020-09-27 | End: 2021-08-23

## 2020-09-27 RX ORDER — SODIUM CHLORIDE, SODIUM LACTATE, POTASSIUM CHLORIDE, CALCIUM CHLORIDE 600; 310; 30; 20 MG/100ML; MG/100ML; MG/100ML; MG/100ML
INJECTION, SOLUTION INTRAVENOUS CONTINUOUS PRN
Status: DISCONTINUED | OUTPATIENT
Start: 2020-09-27 | End: 2020-09-28

## 2020-09-27 RX ORDER — CEFTRIAXONE 1 G/1
1 INJECTION, POWDER, FOR SOLUTION INTRAMUSCULAR; INTRAVENOUS ONCE
Status: COMPLETED | OUTPATIENT
Start: 2020-09-27 | End: 2020-09-27

## 2020-09-27 RX ORDER — SODIUM CHLORIDE 9 MG/ML
INJECTION, SOLUTION INTRAVENOUS CONTINUOUS PRN
Status: DISCONTINUED | OUTPATIENT
Start: 2020-09-27 | End: 2020-09-28

## 2020-09-27 RX ORDER — PROPOFOL 10 MG/ML
INJECTION, EMULSION INTRAVENOUS PRN
Status: DISCONTINUED | OUTPATIENT
Start: 2020-09-27 | End: 2020-09-28

## 2020-09-27 RX ORDER — KETOROLAC TROMETHAMINE 30 MG/ML
INJECTION, SOLUTION INTRAMUSCULAR; INTRAVENOUS PRN
Status: DISCONTINUED | OUTPATIENT
Start: 2020-09-27 | End: 2020-09-28

## 2020-09-27 RX ORDER — DEXAMETHASONE SODIUM PHOSPHATE 10 MG/ML
INJECTION, SOLUTION INTRAMUSCULAR; INTRAVENOUS PRN
Status: DISCONTINUED | OUTPATIENT
Start: 2020-09-27 | End: 2020-09-28

## 2020-09-27 RX ORDER — LIDOCAINE HYDROCHLORIDE 20 MG/ML
INJECTION, SOLUTION INFILTRATION; PERINEURAL PRN
Status: DISCONTINUED | OUTPATIENT
Start: 2020-09-27 | End: 2020-09-28

## 2020-09-27 RX ADMIN — SODIUM CHLORIDE, SODIUM LACTATE, POTASSIUM CHLORIDE, CALCIUM CHLORIDE: 600; 310; 30; 20 INJECTION, SOLUTION INTRAVENOUS at 23:34

## 2020-09-27 RX ADMIN — LIDOCAINE HYDROCHLORIDE 100 MG: 20 INJECTION, SOLUTION INFILTRATION; PERINEURAL at 22:40

## 2020-09-27 RX ADMIN — KETOROLAC TROMETHAMINE 30 MG: 30 INJECTION, SOLUTION INTRAMUSCULAR at 23:36

## 2020-09-27 RX ADMIN — CEFTRIAXONE SODIUM 1 G: 1 INJECTION, POWDER, FOR SOLUTION INTRAMUSCULAR; INTRAVENOUS at 21:03

## 2020-09-27 RX ADMIN — SODIUM CHLORIDE: 9 INJECTION, SOLUTION INTRAVENOUS at 22:30

## 2020-09-27 RX ADMIN — SUGAMMADEX 200 MG: 100 INJECTION, SOLUTION INTRAVENOUS at 23:39

## 2020-09-27 RX ADMIN — PROPOFOL 200 MG: 10 INJECTION, EMULSION INTRAVENOUS at 22:40

## 2020-09-27 RX ADMIN — PHENYLEPHRINE HYDROCHLORIDE 100 MCG: 10 INJECTION INTRAVENOUS at 22:51

## 2020-09-27 RX ADMIN — DEXAMETHASONE SODIUM PHOSPHATE 10 MG: 10 INJECTION, SOLUTION INTRAMUSCULAR; INTRAVENOUS at 22:49

## 2020-09-27 RX ADMIN — MIDAZOLAM 2 MG: 1 INJECTION INTRAMUSCULAR; INTRAVENOUS at 22:30

## 2020-09-27 RX ADMIN — ONDANSETRON 4 MG: 2 INJECTION INTRAMUSCULAR; INTRAVENOUS at 22:49

## 2020-09-27 RX ADMIN — ROCURONIUM BROMIDE 50 MG: 10 INJECTION INTRAVENOUS at 22:40

## 2020-09-27 RX ADMIN — FENTANYL CITRATE 50 MCG: 50 INJECTION, SOLUTION INTRAMUSCULAR; INTRAVENOUS at 22:35

## 2020-09-27 SDOH — HEALTH STABILITY: MENTAL HEALTH: CURRENT SMOKER: 1

## 2020-09-27 ASSESSMENT — LIFESTYLE VARIABLES: TOBACCO_USE: 1

## 2020-09-27 NOTE — LETTER
[facility] OR  [address] [place] [address]  [phone]          September 27, 2020    RE:  Angella Sutton                                                                                                                                                       [address]  [facility] [address]            To whom it may concern:    Angella Sutton is under my professional care. She  may return to work with the following: The employee is UNABLE to return to work until 10/1/20    When the patient returns to work, the following restrictions apply until 10/12/2020:  A) Bend: Occasionally (1-3 hours)  B) Squat: Occasionally (1-3 hours)  C) Walk/Stand: Occasionally (1-3 hours)  D) Reach Above Shoulders: Occasionally (1-3 hours)  E) Lift, carry, push, and pull no more than:  10-15 lbs. Light duty-unable to repetitively lift more than 10-15 pounds      Sincerely,      Estephania Disla, DO

## 2020-09-28 VITALS
RESPIRATION RATE: 21 BRPM | TEMPERATURE: 98.5 F | HEART RATE: 91 BPM | SYSTOLIC BLOOD PRESSURE: 96 MMHG | DIASTOLIC BLOOD PRESSURE: 67 MMHG | OXYGEN SATURATION: 100 %

## 2020-09-28 LAB
C TRACH DNA SPEC QL NAA+PROBE: NEGATIVE
LABORATORY COMMENT REPORT: NORMAL
N GONORRHOEA DNA SPEC QL NAA+PROBE: NEGATIVE
SARS-COV-2 RNA SPEC QL NAA+PROBE: NEGATIVE
SPECIMEN SOURCE: NORMAL

## 2020-09-28 PROCEDURE — 25000132 ZZH RX MED GY IP 250 OP 250 PS 637: Performed by: OBSTETRICS & GYNECOLOGY

## 2020-09-28 PROCEDURE — 59151 TREAT ECTOPIC PREGNANCY: CPT | Performed by: OBSTETRICS & GYNECOLOGY

## 2020-09-28 PROCEDURE — 99203 OFFICE O/P NEW LOW 30 MIN: CPT | Mod: 25 | Performed by: OBSTETRICS & GYNECOLOGY

## 2020-09-28 PROCEDURE — 25000125 ZZHC RX 250: Performed by: OBSTETRICS & GYNECOLOGY

## 2020-09-28 RX ORDER — SODIUM CHLORIDE, SODIUM LACTATE, POTASSIUM CHLORIDE, CALCIUM CHLORIDE 600; 310; 30; 20 MG/100ML; MG/100ML; MG/100ML; MG/100ML
INJECTION, SOLUTION INTRAVENOUS CONTINUOUS
Status: DISCONTINUED | OUTPATIENT
Start: 2020-09-28 | End: 2020-10-05 | Stop reason: HOSPADM

## 2020-09-28 RX ORDER — OXYCODONE AND ACETAMINOPHEN 5; 325 MG/1; MG/1
2 TABLET ORAL EVERY 6 HOURS PRN
Status: DISCONTINUED | OUTPATIENT
Start: 2020-09-28 | End: 2020-10-05 | Stop reason: HOSPADM

## 2020-09-28 RX ORDER — BUPIVACAINE HYDROCHLORIDE AND EPINEPHRINE 2.5; 5 MG/ML; UG/ML
INJECTION, SOLUTION INFILTRATION; PERINEURAL PRN
Status: DISCONTINUED | OUTPATIENT
Start: 2020-09-28 | End: 2020-09-28 | Stop reason: HOSPADM

## 2020-09-28 RX ORDER — FENTANYL CITRATE 50 UG/ML
25-50 INJECTION, SOLUTION INTRAMUSCULAR; INTRAVENOUS
Status: DISCONTINUED | OUTPATIENT
Start: 2020-09-28 | End: 2020-10-05 | Stop reason: HOSPADM

## 2020-09-28 RX ORDER — ACETAMINOPHEN 325 MG/1
650 TABLET ORAL
Status: DISCONTINUED | OUTPATIENT
Start: 2020-09-28 | End: 2020-10-05 | Stop reason: HOSPADM

## 2020-09-28 RX ORDER — OXYCODONE HYDROCHLORIDE 5 MG/1
5 TABLET ORAL EVERY 4 HOURS PRN
Status: DISCONTINUED | OUTPATIENT
Start: 2020-09-28 | End: 2020-10-05 | Stop reason: HOSPADM

## 2020-09-28 RX ORDER — ONDANSETRON 2 MG/ML
4 INJECTION INTRAMUSCULAR; INTRAVENOUS EVERY 30 MIN PRN
Status: DISCONTINUED | OUTPATIENT
Start: 2020-09-28 | End: 2020-10-05 | Stop reason: HOSPADM

## 2020-09-28 RX ORDER — OXYCODONE AND ACETAMINOPHEN 5; 325 MG/1; MG/1
2 TABLET ORAL EVERY 6 HOURS PRN
Qty: 2 TABLET | Refills: 0 | Status: SHIPPED | OUTPATIENT
Start: 2020-09-27 | End: 2021-08-23

## 2020-09-28 RX ORDER — OXYCODONE HYDROCHLORIDE 5 MG/1
5 TABLET ORAL
Status: COMPLETED | OUTPATIENT
Start: 2020-09-28 | End: 2020-09-28

## 2020-09-28 RX ORDER — ALBUTEROL SULFATE 0.83 MG/ML
2.5 SOLUTION RESPIRATORY (INHALATION) EVERY 4 HOURS PRN
Status: DISCONTINUED | OUTPATIENT
Start: 2020-09-28 | End: 2020-10-05 | Stop reason: HOSPADM

## 2020-09-28 RX ORDER — ONDANSETRON 4 MG/1
4 TABLET, ORALLY DISINTEGRATING ORAL
Status: DISCONTINUED | OUTPATIENT
Start: 2020-09-28 | End: 2020-10-05 | Stop reason: HOSPADM

## 2020-09-28 RX ORDER — NALOXONE HYDROCHLORIDE 0.4 MG/ML
.1-.4 INJECTION, SOLUTION INTRAMUSCULAR; INTRAVENOUS; SUBCUTANEOUS
Status: DISCONTINUED | OUTPATIENT
Start: 2020-09-28 | End: 2020-09-29 | Stop reason: HOSPADM

## 2020-09-28 RX ORDER — ONDANSETRON 4 MG/1
4 TABLET, ORALLY DISINTEGRATING ORAL EVERY 30 MIN PRN
Status: DISCONTINUED | OUTPATIENT
Start: 2020-09-28 | End: 2020-10-05 | Stop reason: HOSPADM

## 2020-09-28 RX ORDER — HYDROXYZINE HYDROCHLORIDE 25 MG/1
25 TABLET, FILM COATED ORAL
Status: DISCONTINUED | OUTPATIENT
Start: 2020-09-28 | End: 2020-10-05 | Stop reason: HOSPADM

## 2020-09-28 RX ADMIN — OXYCODONE HYDROCHLORIDE 5 MG: 5 TABLET ORAL at 00:36

## 2020-09-28 NOTE — ANESTHESIA POSTPROCEDURE EVALUATION
Patient: Angella Sutton    Procedure(s):  EVACUATION, ECTOPIC PREGNANCY, LAPAROSCOPIC  Laparoscopic partial right salpingectomy    Diagnosis:Ectopic pregnancy [O00.90]  Diagnosis Additional Information: No value filed.    Anesthesia Type:  General    Note:  Anesthesia Post Evaluation    Patient location during evaluation: PACU and Bedside  Patient participation: Able to fully participate in evaluation  Level of consciousness: awake and alert  Pain management: satisfactory to patient  Airway patency: patent  Cardiovascular status: stable  Respiratory status: nonlabored ventilation, nasal cannula and spontaneous ventilation  Hydration status: stable  PONV: none     Anesthetic complications: None    Comments: Patient was happy with the anesthesia care received and no anesthesia related complications were noted.  I will follow up with the patient again if it is needed.        Last vitals:  Vitals:    09/28/20 0010 09/28/20 0015 09/28/20 0020   BP: 106/61 106/67 104/69   Pulse: 96 92 93   Resp: 20 18 19   Temp:      SpO2: 100% 100% 100%         Electronically Signed By: NIMA Mckinley CRNA  September 28, 2020  12:26 AM   None

## 2020-09-28 NOTE — OP NOTE
HOSPITAL OPERATIVE NOTE  DATE/TIME OF SURGERY: 2020  PATIENT NAME: Susi Damon  MRN: [id number]  PATIENT : 1995      Preoperative Diagnosis: Right ectopic pregnancy    Postoperative Diagnosis: same    Surgeon: Estephania Disla DO    Assist: Stevan Elizondo MD    Procedure:  Laparoscopic partial right salpingectomy with ectopic pregnancy removal    Anesthesia: General    EBL:   5 ml    IVF:  850  ml    Specimen: Right ectopic pregnancy, partial fallopian tube    Findings: Intraoperative findings revealed small, intact ectopic pregnancy within ampulla of right fallopian tube, removed intact in bag. Unremarkable left fallopian tube, ovary and uterus. Appendix was visualized and was unremarkable. Liver and gallbladder grossly normal.    Complications:  none    Indication: Susi Damon is a 25 year old  who presented to the ER with right lower abdominal pain, nausea and vomiting in early pregnancy with an ectopic pregnancy diagnosed on ultrasound. Pregnancy is measuring 5wk5d with FHT of 101. Options for management were discussed with the patient in detail, including medical versus surgical. Patient agreed with operative management and removal of ectopic pregnancy. Details of the procedure were discussed with the patient.  Risks include, but are not limited to, bleeding, infection, and injury to surrounding organs such as the bowel, urinary system, nerves, and blood vessels.  Injury may result in repair at the time of the surgery or in a separate procedure.  All questions answered, and accepting these risks, the patient elects to proceed with the procedure. She is rh positive and will not require rhogam.     Procedure: The patient was taken to the operating room. She then underwent GETA, and was positioned to the dorsal lithotomy position with yellow-fin stirrups. She was prepped and draped in usual fashion. A time out was completed.    Attention was then turned to the  umbilicus, which was elevated manually.  0.5% marcaine was injected. A 5mm incision was made with scalpel. Entry was attempted with the 5 mm Visi-port; however, there was significant resistance at the level of the fascia. Given this and patient's body habitus, this was abandoned. The Veress needle was then requested as this was felt to be the safer option. The Veress needle was inserted in standard fashion with 2 clicks appreciated and successful drop test. Low flow CO2 was activated and initial abdominal pressure <5 mmhg. High was flow was activated and insufflation achieved. The Veress was then removed and entry was made using the 5mm Visiport under direct visualization. No entry trauma visualized. The patient was placed in steep Trendelenburg. Two additional 5 mm ports were placed in the LLQ & RLQ under direct visualization in standard fashion. The bowels were swept away using an atraumatic grasper. Findings were as noted above. The right fallopian tube was sequentially elevated and ligated laterally at the fimbriated end using the Ligasure with removal of ectopic pregnancy. A 5mm endocatch bag was inserted and ectopic placed in bag intact. The endocatch bag and 5mm trocar were then removed on the right side without complication. The specimen was sent for pathology. The pelvis was then examined with good hemostasis noted. The abdomen was desufflated and camera and ports were then removed. The port site skin incisions were closed in a subcuticular fashion with 4-0 monocryl and dermabond.    There were no complications during the procedure. All instrument and sponge counts were correct x2. The patient was awakened from anesthesia without difficult and went to the postanesthesia recovery room in stable condition.    Estephania Disla DO

## 2020-09-28 NOTE — H&P
Gynecology H+P   2020  Angella Sutton  [id number]      HPI: Angella Sutton is a 25 year old  with a right ectopic pregnancy, measuring 5w5d with fetal heart tones. Patient presented to the ER earlier today for nausea and vomiting in early pregnancy with right lower pelvic pain. Pain is similar to menstrual cramps and has been present for the last day. She reports taking motrin and hydrocodone at home without improvement in pain. She denies any vaginal bleeding or irregular discharge. This is an unplanned and undesired pregnancy that she discovered only earlier this week. She has told her family and has been torn about what to do. She has not yet established with a provider to discuss pregnancy options.  She denies fever, chills, SOB, chest pain, palpitations, N/V.  No concerns for headache, vision changes, RUQ or epigastric pain.  No dysuria, constipation or diarrhea.          OBHX:   OB History    Para Term  AB Living   1 0 0 0 0 0   SAB TAB Ectopic Multiple Live Births   0 0 0 0 0      # Outcome Date GA Lbr Maikel/2nd Weight Sex Delivery Anes PTL Lv   1 Current                MedicalHX:   History reviewed. No pertinent past medical history.    SurgicalHX:   History reviewed. No pertinent surgical history.    Medications:   No current facility-administered medications on file prior to encounter.   No current outpatient medications on file prior to encounter.      Allergies:  Allergies   Allergen Reactions     Azithromycin Swelling     Sulfamethoxazole-Trimethoprim Rash     possible       FamilyHX:  Family History   Problem Relation Age of Onset     Lupus Maternal Grandmother      Cerebrovascular Disease Paternal Grandmother      Chronic Obstructive Pulmonary Disease Maternal Grandfather        SocialHX:   Social History     Socioeconomic History     Marital status: Single     Spouse name: None     Number of children: None     Years of education: None     Highest education level: None    Occupational History     None   Social Needs     Financial resource strain: None     Food insecurity     Worry: None     Inability: None     Transportation needs     Medical: None     Non-medical: None   Tobacco Use     Smoking status: Former Smoker     Types: Vaping Device     Smokeless tobacco: Former User     Tobacco comment: pt smokes cigarettes on and off   Substance and Sexual Activity     Alcohol use: Yes     Alcohol/week: 0.0 standard drinks     Comment: 0-1 per month      Drug use: Not Currently     Sexual activity: Not Currently     Partners: Female     Birth control/protection: None   Lifestyle     Physical activity     Days per week: None     Minutes per session: None     Stress: None   Relationships     Social connections     Talks on phone: None     Gets together: None     Attends [employer] service: None     Active member of club or organization: None     Attends meetings of clubs or organizations: None     Relationship status: None     Intimate partner violence     Fear of current or ex partner: None     Emotionally abused: None     Physically abused: None     Forced sexual activity: None   Other Topics Concern     Parent/sibling w/ CABG, MI or angioplasty before 65F 55M? Not Asked   Social History Narrative     None       ROS: 10 point ROS negative other than above    Physical Exam:  Patient Vitals for the past 24 hrs:   BP Temp Temp src Pulse Resp SpO2   09/27/20 1923 123/83 98.7  F (37.1  C) Oral 81 18 100 %     General: AAOx3, appropriately interactive, NAD, appears generally well  CV: RRR, normal S1/S2, no m/r/g  Lungs: CTAB, non-labored breathing, no wheezes, rales, or rhonchi  Abdomen: soft, mild ttp RLQ. No rebound, rigidity or guarding.   SVE:  Deferred to OR  Extremities: Non-tender without edema bilaterally in LE  Psych: Cooperative, conversive,  AAOx3    Labs:    Lab Results   Component Value Date    ABO O 09/27/2020    RH Pos 09/27/2020    AS Neg 09/27/2020    HEPBANG Nonreactive  2020    CHPCRT Negative 2020    GCPCRT Negative 2020    TREPAB Negative 2018    HGB 11.9 2020       GBS Status:   No results found for: GBS    Lab Results   Component Value Date    PAP NIL 2019       CBC and Type&Screen pending    Results for orders placed or performed during the hospital encounter of 20   US OB <14 Weeks W Transvaginal    Narrative    OBSTETRIC ULTRASOUND LESS THAN 14 WEEKS WITH TRANSVAGINAL SINGLE   2020 8:51 PM    HISTORY: Right pelvic pain    TECHNIQUE: Transabdominal and transvaginal imaging were performed.   Transvaginal imaging was performed to better evaluate the uterus and  gestational sac.    COMPARISON:  None.    FINDINGS: No intrauterine gestation is identified. Mass with  gestational sac, fetal pole and yolk sac is seen in the right adnexa  adjacent to the right ovary.    Estimated gestational age by current ultrasound measurement: 5 weeks 5  days.  Estimated date of delivery based on this ultrasound: 2021.  Patient reported LMP: 8/15/2020.  Estimated gestational age by reported LMP: 6 weeks 1 day.    Crown-rump length: 0.3 cm.   Embryonic cardiac activity: 106 bpm.   Yolk sac: Present.  Subchorionic hemorrhage: None.    Gestational sac shape: Rounded but in the right adnexa.  Amniotic fluid volume: Slightly qualitatively low for age of  gestation.    Right ovary: Unremarkable.  Left ovary: Unremarkable.  Adnexal mass: Right adnexal mass with fetal pole and yolk sac is  consistent with ectopic pregnancy.  Free pelvic fluid: None.      Impression    IMPRESSION: Right adnexal ectopic pregnancy containing a fetal pole  and yolk sac measuring 6 weeks 0 days gestational age as determined by  crown-rump length. No intrauterine gestation is identified.    I called Ange Hussein on 2020 at 8:55 PM regarding the right  adnexal ectopic pregnancy.        Assessment: 25 year old  at 6w1d by a right ectopic pregnancy, measuring 5w5d with  fetal heart tones. Currently hemodynamically stable    Plan:   Rh positive, rhogam not indicated  Options for management discussed with patient in detail, including medical (methotrexate) versus surgical. Given GA of ectopic pregnancy in addition to heart tones, recommendation made to proceed with surgical procedure. Procedure to include diagnostic laparoscopy, removal of ectopic pregnancy, possible right salpingectomy, possible oopherectomy, possible laparotomy. Future pregnancy as well as risk for recurrence discussed in detail. We also discussed the risk, although low, of possible heterotopic pregnancy in the uterus. Details of the procedure were discussed with the patient.  Risks include, but are not limited to, bleeding, infection, and injury to surrounding organs such as the bowel, urinary system, nerves, and blood vessels.  Injury may result in repair at the time of the surgery or in a separate procedure.  All questions answered, and accepting these risks, the patient elects to proceed with the procedure.     Contraception- Patient not current taking anything but would like to go back onto birth control pills. Will send in Rx for OCPs. Has previously taken these without complication. Reviewed potential side effects of OCP's including but not limited to thromboembolic events, hypertension, breakthrough bleeding, GI upset, and headaches.  Reviewed proper usage.     UTI- S/p rocephin in the ER. Rx for outpatient antibiotics per ED sent to pharmacy.    Estephania Disla DO  OB/GYN  September 27, 2020

## 2020-09-28 NOTE — ED TRIAGE NOTES
Pt presents with concerns of abdominal cramps.  Pt states that last night she had cramps and today worsened.  Cramps are only on the right side, lower abdominal.  Pt states that last week she found out she was pregnant.  Pt states that she tried Ibuprofen and a hydrocodone earlier without relief.  Pt is about 6 weeks per LMP.   Patient's airway, breathing, circulation, and disability/mental status (ABCDs) intact/WDL during triage.

## 2020-09-28 NOTE — ANESTHESIA CARE TRANSFER NOTE
Patient: Angella Sutton    Procedure(s):  EVACUATION, ECTOPIC PREGNANCY, LAPAROSCOPIC  Laparoscopic partial right salpingectomy    Diagnosis: Ectopic pregnancy [O00.90]  Diagnosis Additional Information: No value filed.    Anesthesia Type:   General     Note:  Airway :Nasal Cannula  Patient transferred to:PACU  Handoff Report: Identifed the Patient, Identified the Reponsible Provider, Reviewed the pertinent medical history, Discussed the surgical course, Reviewed Intra-OP anesthesia mangement and issues during anesthesia, Set expectations for post-procedure period and Allowed opportunity for questions and acknowledgement of understanding      Vitals: (Last set prior to Anesthesia Care Transfer)    CRNA VITALS  9/27/2020 2331 - 9/28/2020 0011      9/28/2020             Pulse:  106    SpO2:  99 %    Resp Rate (observed):  14    EKG:  NSR                Electronically Signed By: NIMA Mckinley CRNA  September 28, 2020  12:11 AM

## 2020-09-28 NOTE — ANESTHESIA PREPROCEDURE EVALUATION
Anesthesia Pre-Procedure Evaluation    Patient: Angella Sutton   MRN: [id number] : 1995          Preoperative Diagnosis: Ectopic pregnancy [O00.90]    Procedure(s):  SALPINGO-OOPHORECTOMY, LAPAROSCOPIC    History reviewed. No pertinent past medical history.  History reviewed. No pertinent surgical history.    Anesthesia Evaluation     . Pt has had prior anesthetic. Type: MAC    No history of anesthetic complications          ROS/MED HX    ENT/Pulmonary: Comment: Vaps occasionally    (+)tobacco use, , . .    Neurologic:  - neg neurologic ROS     Cardiovascular:  - neg cardiovascular ROS   (+) ----. : . . . :. . No previous cardiac testing       METS/Exercise Tolerance:     Hematologic:  - neg hematologic  ROS       Musculoskeletal:  - neg musculoskeletal ROS       GI/Hepatic:  - neg GI/hepatic ROS       Renal/Genitourinary:  - ROS Renal section negative   (+) Other Renal/ Genitourinary, Ectopic pregnancy      Endo:  - neg endo ROS       Psychiatric:     (+) psychiatric history anxiety and depression      Infectious Disease:  - neg infectious disease ROS       Malignancy:      - no malignancy   Other:                          Physical Exam  Normal systems: cardiovascular, pulmonary and dental    Airway   Mallampati: II  TM distance: >3 FB  Neck ROM: full    Dental     Cardiovascular   Rhythm and rate: regular and normal  (-) no murmur    Pulmonary    breath sounds clear to auscultation    Other findings: NPO solids 1800 yesterday  Cl liquids 1300 today        Lab Results   Component Value Date    WBC 10.2 2020    HGB 11.9 2020    HCT 35.3 2020     2020    CRP <2.9 2017    SED 42 (H) 2017     2020    POTASSIUM 4.0 2020    CHLORIDE 107 2020    CO2 25 2020    BUN 8 2020    CR 0.57 2020    GLC 88 2020    NAYAN 9.3 2020    ALBUMIN 3.8 2020    PROTTOTAL 7.0 2020    ALT 18 2020    AST 18 2020     "ALKPHOS 58 09/27/2020    BILITOTAL 0.3 09/27/2020    LIPASE 159 03/09/2015    HCG Positive (A) 09/27/2020       Preop Vitals  BP Readings from Last 3 Encounters:   09/27/20 123/83   09/09/20 98/60   07/29/20 92/42    Pulse Readings from Last 3 Encounters:   09/27/20 81   09/09/20 68   01/21/20 70      Resp Readings from Last 3 Encounters:   09/27/20 18   09/09/20 14   01/21/20 16    SpO2 Readings from Last 3 Encounters:   09/27/20 100%   01/21/20 100%   12/10/19 100%      Temp Readings from Last 1 Encounters:   09/27/20 98.7  F (37.1  C) (Oral)    Ht Readings from Last 1 Encounters:   09/09/20 1.59 m (5' 2.6\")      Wt Readings from Last 1 Encounters:   09/09/20 57.2 kg (126 lb)    Estimated body mass index is 22.61 kg/m  as calculated from the following:    Height as of 9/9/20: 1.59 m (5' 2.6\").    Weight as of 9/9/20: 57.2 kg (126 lb).       Anesthesia Plan      History & Physical Review  History and physical reviewed and following examination; no interval change.    ASA Status:  2 emergent.    NPO Status:  > 6 hours    Plan for General with Intravenous and Propofol induction. Maintenance will be Balanced.    PONV prophylaxis:  Ondansetron (or other 5HT-3) and Dexamethasone or Solumedrol    The patient is a current SmokerPatient not instructed to abstain from smoking on day of procedure and patient did not smoke on day of surgery     Postoperative Care  Postoperative pain management:  IV analgesics, Oral pain medications and Multi-modal analgesia.      Consents  Anesthetic plan, risks, benefits and alternatives discussed with:  Patient.  Use of blood products discussed: No .   .                 NIMA Mckinley CRNA  "

## 2020-09-28 NOTE — ED PROVIDER NOTES
History     Chief Complaint   Patient presents with     Abdominal Pain     The history is provided by the patient.     Angella Sutton is a 25 year old female who presents to the emergency department with concerns of abdominal pain. The patient states that she developed abdominal pain last night that felt like menstrual cramps. Throughout the day today these have been getting progressively worse. She has vomited once as a result of the pain. She describes it as a constant, localized pain in her right lower quadrant. No fever. She has no abdominal surgical history. No history of ovarian cysts. She has been having more discharge than usual, but says it has not been abnormal. The patient found out she was pregnant last week. Her last menstrual period was 08/15/2020. This is her first pregnancy. She thinks she is about one month along. She took 2 Ibuprofen and 1 Hydrocodone earlier, but says neither have helped her pain.     Allergies:  Allergies   Allergen Reactions     Azithromycin Swelling     Sulfamethoxazole-Trimethoprim Rash     possible       Problem List:    Patient Active Problem List    Diagnosis Date Noted     Moderate episode of recurrent major depressive disorder (H) 09/04/2019     Priority: Medium     Generalized anxiety disorder 02/13/2015     Priority: Medium     Trichotillomania 02/13/2015     Priority: Medium     Other disorder of eating of nonorganic origin 02/13/2015     Priority: Medium        Past Medical History:    History reviewed. No pertinent past medical history.    Past Surgical History:    History reviewed. No pertinent surgical history.    Family History:    Family History   Problem Relation Age of Onset     Lupus Maternal Grandmother      Cerebrovascular Disease Paternal Grandmother      Chronic Obstructive Pulmonary Disease Maternal Grandfather        Social History:  Marital Status:  Single [1]  Social History     Tobacco Use     Smoking status: Former Smoker     Types: Vaping Device      Smokeless tobacco: Former User     Tobacco comment: pt smokes cigarettes on and off   Substance Use Topics     Alcohol use: Yes     Alcohol/week: 0.0 standard drinks     Comment: 0-1 per month      Drug use: Not Currently        Medications:    No current outpatient medications on file.        Review of Systems   All other systems reviewed and are negative.      Physical Exam   BP: 123/83  Pulse: 81  Temp: 98.7  F (37.1  C)  Resp: 18  SpO2: 100 %      Physical Exam  Vitals signs and nursing note reviewed.   Constitutional:       General: She is not in acute distress.     Appearance: Normal appearance. She is not diaphoretic.   HENT:      Head: Normocephalic and atraumatic.      Mouth/Throat:      Mouth: Mucous membranes are moist.   Eyes:      Extraocular Movements: Extraocular movements intact.      Pupils: Pupils are equal, round, and reactive to light.   Neck:      Musculoskeletal: Normal range of motion and neck supple. No neck rigidity.   Cardiovascular:      Rate and Rhythm: Normal rate.   Pulmonary:      Effort: Pulmonary effort is normal. No respiratory distress.   Abdominal:      Palpations: Abdomen is soft.      Tenderness: There is abdominal tenderness (right pelvic tenderness). There is no guarding or rebound.   Musculoskeletal: Normal range of motion.         General: No deformity or signs of injury.   Skin:     General: Skin is dry.      Findings: No rash.   Neurological:      Mental Status: She is alert and oriented to person, place, and time.   Psychiatric:         Behavior: Behavior normal.         Thought Content: Thought content normal.         ED Course        Procedures      Results for orders placed or performed during the hospital encounter of 09/27/20 (from the past 24 hour(s))   HCG qualitative urine   Result Value Ref Range    HCG Qual Urine Positive (A) NEG^Negative   UA with Microscopic   Result Value Ref Range    Color Urine Yellow     Appearance Urine Cloudy     Glucose Urine  Negative NEG^Negative mg/dL    Bilirubin Urine Negative NEG^Negative    Ketones Urine 20 (A) NEG^Negative mg/dL    Specific Gravity Urine 1.023 1.003 - 1.035    Blood Urine Negative NEG^Negative    pH Urine 6.0 5.0 - 7.0 pH    Protein Albumin Urine Negative NEG^Negative mg/dL    Urobilinogen mg/dL 0.0 0.0 - 2.0 mg/dL    Nitrite Urine Negative NEG^Negative    Leukocyte Esterase Urine Large (A) NEG^Negative    Source Midstream Urine     WBC Urine 20 (H) 0 - 5 /HPF    RBC Urine 2 0 - 2 /HPF    Bacteria Urine Few (A) NEG^Negative /HPF    Squamous Epithelial /HPF Urine 12 (H) 0 - 1 /HPF    Mucous Urine Present (A) NEG^Negative /LPF   CBC with platelets differential   Result Value Ref Range    WBC 10.2 4.0 - 11.0 10e9/L    RBC Count 3.83 3.8 - 5.2 10e12/L    Hemoglobin 11.9 11.7 - 15.7 g/dL    Hematocrit 35.3 35.0 - 47.0 %    MCV 92 78 - 100 fl    MCH 31.1 26.5 - 33.0 pg    MCHC 33.7 31.5 - 36.5 g/dL    RDW 12.6 10.0 - 15.0 %    Platelet Count 320 150 - 450 10e9/L    Diff Method Automated Method     % Neutrophils 78.4 %    % Lymphocytes 14.1 %    % Monocytes 5.4 %    % Eosinophils 1.3 %    % Basophils 0.3 %    % Immature Granulocytes 0.5 %    Nucleated RBCs 0 0 /100    Absolute Neutrophil 8.0 1.6 - 8.3 10e9/L    Absolute Lymphocytes 1.4 0.8 - 5.3 10e9/L    Absolute Monocytes 0.6 0.0 - 1.3 10e9/L    Absolute Basophils 0.0 0.0 - 0.2 10e9/L    Abs Immature Granulocytes 0.1 0 - 0.4 10e9/L    Absolute Nucleated RBC 0.0    Comprehensive metabolic panel   Result Value Ref Range    Sodium 138 133 - 144 mmol/L    Potassium 4.0 3.4 - 5.3 mmol/L    Chloride 107 94 - 109 mmol/L    Carbon Dioxide 25 20 - 32 mmol/L    Anion Gap 6 3 - 14 mmol/L    Glucose 88 70 - 99 mg/dL    Urea Nitrogen 8 7 - 30 mg/dL    Creatinine 0.57 0.52 - 1.04 mg/dL    GFR Estimate >90 >60 mL/min/[1.73_m2]    GFR Estimate If Black >90 >60 mL/min/[1.73_m2]    Calcium 9.3 8.5 - 10.1 mg/dL    Bilirubin Total 0.3 0.2 - 1.3 mg/dL    Albumin 3.8 3.4 - 5.0 g/dL     Protein Total 7.0 6.8 - 8.8 g/dL    Alkaline Phosphatase 58 40 - 150 U/L    ALT 18 0 - 50 U/L    AST 18 0 - 45 U/L   ABO/Rh type and screen   Result Value Ref Range    ABO O     RH(D) Pos     Antibody Screen Neg     Test Valid Only At [facility]        Specimen Expires 09/30/2020    HCG quantitative pregnancy   Result Value Ref Range    HCG Quantitative Serum 7,412 (H) 0 - 5 IU/L   US OB <14 Weeks W Transvaginal    Narrative    OBSTETRIC ULTRASOUND LESS THAN 14 WEEKS WITH TRANSVAGINAL SINGLE   9/27/2020 8:51 PM    HISTORY: Right pelvic pain    TECHNIQUE: Transabdominal and transvaginal imaging were performed.   Transvaginal imaging was performed to better evaluate the uterus and  gestational sac.    COMPARISON:  None.    FINDINGS: No intrauterine gestation is identified. Mass with  gestational sac, fetal pole and yolk sac is seen in the right adnexa  adjacent to the right ovary.    Estimated gestational age by current ultrasound measurement: 5 weeks 5  days.  Estimated date of delivery based on this ultrasound: 5/22/2021.  Patient reported LMP: 8/15/2020.  Estimated gestational age by reported LMP: 6 weeks 1 day.    Crown-rump length: 0.3 cm.   Embryonic cardiac activity: 106 bpm.   Yolk sac: Present.  Subchorionic hemorrhage: None.    Gestational sac shape: Rounded but in the right adnexa.  Amniotic fluid volume: Slightly qualitatively low for age of  gestation.    Right ovary: Unremarkable.  Left ovary: Unremarkable.  Adnexal mass: Right adnexal mass with fetal pole and yolk sac is  consistent with ectopic pregnancy.  Free pelvic fluid: None.      Impression    IMPRESSION: Right adnexal ectopic pregnancy containing a fetal pole  and yolk sac measuring 6 weeks 0 days gestational age as determined by  crown-rump length. No intrauterine gestation is identified.    I called Ange Hussein on 9/27/2020 at 8:55 PM regarding the right  adnexal ectopic pregnancy.       Medications   cefTRIAXone  (ROCEPHIN) 1 g vial to attach to  mL bag for ADULTS or NS 50 mL bag for PEDS (0 g Intravenous Stopped 9/27/20 2135)       Assessments & Plan (with Medical Decision Making)  25 year old female who presents with concerns of abdominal pain. No abdominal surgical history. Upon arrival in the ED the patient is afebrile and all vitals are within normal limits. Exam revealed some right pelvic tenderness, but was otherwise normal. No rebound or guarding.   The patient notes that her pain is tolerable while laying here in the ED and she declined pain medications. I explained to her that she can no longer take Ibuprofen throughout her pregnancy. Labs were collected and reviewed. CBC and CMP showed no abnormalities.    UA returns positive for UTI, culture pending, patient given IV fluids here and IV Rocephin.  Her ultrasound did confirm an ectopic pregnancy and right adnexal region.  Patient is O+.  Beta hCG quant is pending.  Dr. Disla called and updated on findings and will be in for ectopic removal.  Patient is appropriately n.p.o. for procedure she has not eaten all day and has had very few liquids.  No history of surgery other than wisdom tooth removal with no anesthesia complications at that time.  Patient to OR in stable condition.     I have reviewed the nursing notes.    I have reviewed the findings, diagnosis, plan and need for follow up with the patient.      New Prescriptions    No medications on file       Final diagnoses:   Ectopic pregnancy of right ovary       This document serves as a record of services personally performed by Ange Hussein, OLEG*. It was created on their behalf by Marlen Medina, a trained medical scribe. The creation of this record is based on the provider's personal observations and the statements of the patient. This document has been checked and approved by the attending provider.  Note: Chart documentation done in part with Dragon Voice Recognition software. Although  reviewed after completion, some word and grammatical errors may remain.  9/27/2020   [facility] EMERGENCY DEPARTMENT     Ange Hussein, NIMA CNP  09/27/20 0717

## 2020-09-29 LAB
BACTERIA SPEC CULT: NORMAL
BACTERIA SPEC CULT: NORMAL
COPATH REPORT: NORMAL
Lab: NORMAL
SPECIMEN SOURCE: NORMAL

## 2020-09-29 NOTE — OR NURSING
"[facility] Same Day Surgery  Discharge Call Back  Angella Sutton  1995  MRN: [id number]  Home: [phone] (home)   PCP: No primary care provider on file.    We are calling to see how you're doing since your surgery/procedure with us?   Comments: \"I am doing well.\"  Clinical Questions  1. Have you had time to look at your discharge instructions? Do you have any questions in regards to the instructions?   Comment: \"yes, no questions\"  2. Do you feel your pain is being controlled with the regimen the surgeon sent you home on? (ie: prescription medications, over the counter pain medications, ice packs)   Comments: \"no pain\"  3. Have you noticed any drainage on your dressing? Do you know what to do if you have bleeding as a result of your procedure?   Comments: \"no bleeding\"  4. Have you had any nausea/vomiting? Do you know how to treat this?   Comment: \"no\"  5. Have you had any signs/symptoms of infection? (ie: fever, swelling, heat, drainage or redness) Do you know what to do if you have?   Comment: \"no\"  6. Do you have a follow up appointment made with your surgeon? Do you have a number to contact them at if you need it?   Comment: no follow up appointment is scheduled, patient indicated Dr. Disla wanted to see patient in clinic next week.  Gave patient phone number to call to get appointment scheduled.  Retained Foreign Object (LEOLA, Hemovac, Penrose, Wound Packing, Vaginal Packing, Nasal Splints, Urethral Stents, Diamond Catheter)  1. Do you still have  in place?   2. If the item is still in place, can you review the plan for removal with me?       You may be randomly selected to fill out a [facility] survey. We would appreciate you taking the time to fill this out. It is important to us if you would answer all of the questions on the survey.              "

## 2020-09-30 LAB
SARS-COV-2 RNA SPEC QL NAA+PROBE: NORMAL
SPECIMEN SOURCE: NORMAL

## 2020-10-07 ENCOUNTER — TELEPHONE (OUTPATIENT)
Facility: CLINIC | Age: 25
End: 2020-10-07

## 2020-10-07 ENCOUNTER — VIRTUAL VISIT (OUTPATIENT)
Dept: OBGYN | Facility: CLINIC | Age: 25
End: 2020-10-07
Payer: COMMERCIAL

## 2020-10-07 DIAGNOSIS — Z98.890 POSTOPERATIVE STATE: Primary | ICD-10-CM

## 2020-10-07 DIAGNOSIS — Z30.8 ENCOUNTER FOR OTHER CONTRACEPTIVE MANAGEMENT: ICD-10-CM

## 2020-10-07 PROCEDURE — 99024 POSTOP FOLLOW-UP VISIT: CPT | Performed by: OBSTETRICS & GYNECOLOGY

## 2020-10-07 RX ORDER — NORETHINDRONE ACETATE AND ETHINYL ESTRADIOL 1MG-20(21)
1 KIT ORAL DAILY
Qty: 90 TABLET | Refills: 4 | Status: SHIPPED | OUTPATIENT
Start: 2020-10-07 | End: 2021-08-23

## 2020-10-07 NOTE — LETTER
[address] [address]  [phone]          October 7, 2020    RE:  Angella Sutton                                                                                                                                                       [address] [place] [address] [address]  [facility] [address]            To whom it may concern:    Angella Sutton is under my professional care. She  may return to work with the following: The employee can return to work on 10/12 without any restrictions.      Sincerely,        Estephania Disla DO

## 2020-10-07 NOTE — TELEPHONE ENCOUNTER
Pt cannot make her 2pm apt today and wants to go back to work on Monday but her work restrictions are till Tuesday.  Can someone please call her and let her know if she needs an apt or if she can just get a new note.

## 2020-10-07 NOTE — PROGRESS NOTES
SUBJECTIVE:       HPI: Angella Sutton is a 25 year old  is s/p Laparoscopic partial right salpingectomy on  for ectopic pregnancy at 6 weeks. Since surgery, she has been doing well. She has had a period. She has not been sexually active. She denies fevers/chills, cp/spb, n/v, dysuria, constipation or diarrhea. She reports that her incisions are healing well, no signs of drainage or bleeding    She is interested in contraception and has started OCPs since surgery.    Ob Hx:      Gyn Hx: Patient's last menstrual period was 08/15/2020.     Last pap was 3/20/19 NIL         Today's PHQ-2 Score:   PHQ-2 (  [employer]) 2020   Q1: Little interest or pleasure in doing things -   Q2: Feeling down, depressed or hopeless -   PHQ-2 Score -   Q1: Little interest or pleasure in doing things -   Q2: Feeling down, depressed or hopeless -   PHQ-2 Score Incomplete     Today's PHQ-9 Score:   PHQ-9 SCORE 2020   PHQ-9 Total Score -   PHQ-9 Total Score MyChart 27 (Severe depression)   PHQ-9 Total Score 27     Today's SEEMA-7 Score:   SEEMA-7 SCORE 2020   Total Score -   Total Score 21 (severe anxiety)   Total Score 21       Problem list and histories reviewed & adjusted, as indicated.  Additional history: as documented.    Patient Active Problem List   Diagnosis     Generalized anxiety disorder     Trichotillomania     Other disorder of eating of nonorganic origin     Moderate episode of recurrent major depressive disorder (H)     Past Surgical History:   Procedure Laterality Date     LAPAROSCOPIC EVACUATION ECTOPIC PREGNANCY N/A 2020    Procedure: EVACUATION, ECTOPIC PREGNANCY, LAPAROSCOPIC;  Surgeon: Estephania Disla DO;  Location:  OR     LAPAROSCOPIC SALPINGECTOMY Right 2020    Procedure: Laparoscopic partial right salpingectomy;  Surgeon: Estephania Disla DO;  Location:  OR      Social History     Tobacco Use     Smoking status: Former Smoker     Types: Vaping Device     Smokeless  tobacco: Former User   Substance Use Topics     Alcohol use: Yes     Alcohol/week: 0.0 standard drinks     Comment: 0-1 per month       Problem (# of Occurrences) Relation (Name,Age of Onset)    Cerebrovascular Disease (1) Paternal Grandmother    Chronic Obstructive Pulmonary Disease (1) Maternal Grandfather    Lupus (1) Maternal Grandmother                 cefdinir (OMNICEF) 300 MG capsule, Take 1 capsule (300 mg) by mouth 2 times daily for 10 days (Patient not taking: Reported on 10/7/2020)       norethindrone-ethinyl estradiol (MICROGESTIN 1/20) 1-20 MG-MCG tablet, Take 1 tablet by mouth daily (Patient not taking: Reported on 10/7/2020)       oxyCODONE-acetaminophen (PERCOCET) 5-325 MG tablet, Take 2 tablets by mouth every 6 hours as needed for pain (Patient not taking: Reported on 10/7/2020)       oxyCODONE-acetaminophen (PERCOCET) 5-325 MG tablet, Take 1-2 tablets by mouth every 4 hours as needed for moderate to severe pain (Patient not taking: Reported on 10/7/2020)    No current facility-administered medications on file prior to visit.     Allergies   Allergen Reactions     Azithromycin Swelling     Sulfamethoxazole-Trimethoprim Rash     possible       ROS:  10 Point review of systems negative other noted above in HPI    OBJECTIVE:     LMP 08/15/2020   There is no height or weight on file to calculate BMI.      Otherwise unable to perform vital signs and physical exam due to nature of phone visit amid COVID-19 precautions.       GENERAL: healthy, alert and no distress  EYES: Eyes grossly normal to inspection, conjunctivae and sclerae normal  RESP: no audible wheeze, cough, or visible cyanosis.  No visible retractions or increased work of breathing.  Able to speak fully in complete sentences.  CV: No obvious leg edema.   NEURO: Cranial nerves grossly intact, mentation intact and speech normal  PSYCH: mentation appears normal, affect normal/bright, judgement and insight intact, normal speech and appearance  well-groomed        In-Clinic Test Results:  No results found for this or any previous visit (from the past 24 hour(s)).   Pathology:  SPECIMEN(S):   Ectopic pregnancy     FINAL DIAGNOSIS:   Right fallopian tube, laparoscopic partial right salpingectomy with   ectopic pregnancy removal   - Chorionic villi and trophoblasts diagnostic of ectopic tubal pregnancy.     Electronically signed out by:     Guido Bella M.D., PhD     ASSESSMENT/PLAN:                                                      Angella Sutton is a 25 year old  s/p  Laparoscopic partial right salpingectomy on  for ectopic pregnancy at 6 weeks      ICD-10-CM    1. Postoperative state  Z98.890    2. Encounter for other contraceptive management  Z30.8 norethindrone-ethinyl estradiol (JUNEL FE 1/20) 1-20 MG-MCG tablet       Doing well postoperatively. Has had  vaginal bleeding since surgery. Not yet sexually active.    Cleared for routine to normal activity, including intercourse. Work note provided    Contraception - OCPs refilled.    RTO as needed       Estephania Disla DO  [facility]

## 2020-10-07 NOTE — TELEPHONE ENCOUNTER
Estephania Disla, DO  You 6 minutes ago (2:31 PM)     I can at 245 or so,     Estephania Disla, DO

## 2020-10-07 NOTE — TELEPHONE ENCOUNTER
Pt had a laparoscopic evacuation of an ectopic pregnancy on 9/27/2020.    Will route pt's request to go back to work on Monday, 10/12, and a new note stating this or if she needs to be seen first.    Moriah Jennings RN

## 2020-10-07 NOTE — PROGRESS NOTES
"Angella Sutton is a 25 year old female who is being evaluated via a billable telephone visit.      The patient has been notified of following:     \"This telephone visit will be conducted via a call between you and your physician/provider. We have found that certain health care needs can be provided without the need for a physical exam.  This service lets us provide the care you need with a short phone conversation.  If a prescription is necessary we can send it directly to your pharmacy.  If lab work is needed we can place an order for that and you can then stop by our lab to have the test done at a later time.    Telephone visits are billed at different rates depending on your insurance coverage. During this emergency period, for some insurers they may be billed the same as an in-person visit.  Please reach out to your insurance provider with any questions.    If during the course of the call the physician/provider feels a telephone visit is not appropriate, you will not be charged for this service.\"    Patient has given verbal consent for Telephone visit?  Yes    What phone number would you like to be contacted at? [phone]    How would you like to obtain your AVS? Baljeet    Phone call duration: 5 minutes    Estephania Disla, DO      "

## 2020-10-07 NOTE — TELEPHONE ENCOUNTER
Estephania Disla, DO  You 1 hour ago (12:34 PM)     Can she do a phone visit today or tomorrow>     Estephania Disla,       Pt states she can do a phone visit now. Will route to Dr. Disla to see if she would be willing to call pt now even tough she doesn't have any available appts today.    Moriah Jennings RN

## 2020-11-22 ENCOUNTER — HEALTH MAINTENANCE LETTER (OUTPATIENT)
Age: 25
End: 2020-11-22

## 2021-03-23 NOTE — LETTER
[facility] EMERGENCY DEPARTMENT  [address] [place] [address]  [phone]    2017    Angella Sutton  [address] [address]  [phone] (home)     : 1995      To Whom it may concern:    Angella Sutton was seen in our Emergency Department today, 2017.  I expect her condition to improve over the next few days.  She may return to work when improved.    Sincerely,            Duane Medina     The patient is a 32y Female complaining of

## 2021-06-15 ENCOUNTER — NURSE TRIAGE (OUTPATIENT)
Dept: NURSING | Facility: CLINIC | Age: 26
End: 2021-06-15

## 2021-06-15 NOTE — TELEPHONE ENCOUNTER
Anupam needs reprint of emotional support letter that she needs another copy of that she got from the clinic about 2 years ago from Jaimie.  She would like copy mailed to her home .    Kita Almaguer RN  [facility] Nurse Advisor

## 2021-06-20 ENCOUNTER — HOSPITAL ENCOUNTER (EMERGENCY)
Facility: CLINIC | Age: 26
End: 2021-06-20
Payer: COMMERCIAL

## 2021-08-20 NOTE — PROGRESS NOTES
[facility] [place]  [address], [address]  [place] [address]  Phone: [phone]  Fax: [phone]  Primary Provider: Denice Ref-Primary, Physician  Pre-op Performing Provider: CHARLETTE CHAVEZ      PREOPERATIVE EVALUATION:  Today's date: 8/23/2021    Angella Sutton is a 26 year old female who presents for a preoperative evaluation.    Surgical Information:  Surgery/Procedure: Breast augmentation  Surgery Location: [facility]  Surgeon: Stevan Astudillo MD  Surgery Date: 9/8/2021  Time of Surgery: 9am  Where patient plans to recover: At home with family  Fax number for surgical facility: [phone]    Type of Anesthesia Anticipated: to be determined    Assessment & Plan     The proposed surgical procedure is considered INTERMEDIATE risk.    Preop general physical exam  - CBC with platelets; Future  - HCG Qual, Urine ([id number]); Future  - Asymptomatic COVID-19 Virus (Coronavirus) by PCR Nasopharyngeal; Future  - HCG Qual, Urine ([id number])  - CBC with platelets    Encounter for cosmetic surgery  Angella is needing covid PCR test prior to surgery- difficulty scheduling due to need on the Sunday or Monday of Labor Day weekend. Will place order.   Advised condoms for pregnancy prevention prior to surgery. Negative urine hcg today.   Cbc normal.     Health Maintenance  She declines a covid vaccine today     Risks and Recommendations:  The patient has the following additional risks and recommendations for perioperative complications:   - No identified additional risk factors other than previously addressed    Medication Instructions:  Patient is on no chronic medications    RECOMMENDATION:  APPROVAL GIVEN to proceed with proposed procedure, without further diagnostic evaluation.    Charlette Chavez PA-C      Subjective     HPI related to upcoming procedure: Patient planning breast augmentation surgery, desired for cosmetic reasons.      Preop Questions 8/23/2021   1. Have you  ever had a heart attack or stroke? No   2. Have you ever had surgery on your heart or blood vessels, such as a stent placement, a coronary artery bypass, or surgery on an artery in your head, neck, heart, or legs? No   3. Do you have chest pain with activity? No   4. Do you have a history of  heart failure? No   5. Do you currently have a cold, bronchitis or symptoms of other infection? No   6. Do you have a cough, shortness of breath, or wheezing? No   7. Do you or anyone in your family have previous history of blood clots? No   8. Do you or does anyone in your family have a serious bleeding problem such as prolonged bleeding following surgeries or cuts? No   9. Have you ever had problems with anemia or been told to take iron pills? No   10. Have you had any abnormal blood loss such as black, tarry or bloody stools, or abnormal vaginal bleeding? No   11. Have you ever had a blood transfusion? No   12. Are you willing to have a blood transfusion if it is medically needed before, during, or after your surgery? Yes   13. Have you or any of your relatives ever had problems with anesthesia? No   14. Do you have sleep apnea, excessive snoring or daytime drowsiness? No   15. Do you have any artifical heart valves or other implanted medical devices like a pacemaker, defibrillator, or continuous glucose monitor? No   16. Do you have artificial joints? No   17. Are you allergic to latex? No   18. Is there any chance that you may be pregnant? No     Health Care Directive:  Patient does not have a Health Care Directive or Living Will: Discussed advance care planning with patient; however, patient declined at this time.    Preoperative Review of :   reviewed - no record of controlled substances prescribed.    For exercise: pretty active, gym daily - lifting/weights; on feet as - active; feels good with exercise. Limitations with exercise due to: nothing. Denies CV sxs with exercise including CP, SOB, palpitations,  "RIZZO, presyncope.     Pt denies any personal medical history of diabetes, hypertension, hyperlipidemia, thyroid problems, CKD, irregular heartbeat/a.fib, CAD/PVD, sleep apnea, Asthma/COPD, DVT/PE. Patient is a nonsmoker.       Patient's last menstrual period was 08/12/2021. Regular. Bleeding 3 days, light. She and partner use \"pullout\" method.       Review of Systems  CONSTITUTIONAL: NEGATIVE for fever, chills, change in weight  INTEGUMENTARY/SKIN: NEGATIVE for worrisome rashes, moles or lesions  EYES: NEGATIVE for vision changes or irritation  ENT/MOUTH: NEGATIVE for ear, mouth and throat problems  RESP: NEGATIVE for significant cough or SOB  CV: NEGATIVE for chest pain, palpitations or peripheral edema  GI: NEGATIVE for nausea, abdominal pain, heartburn, or change in bowel habits  : NEGATIVE for frequency, dysuria, or hematuria  MUSCULOSKELETAL: NEGATIVE for significant arthralgias or myalgia  NEURO: NEGATIVE for weakness, dizziness or paresthesias  ENDOCRINE: NEGATIVE for temperature intolerance, skin/hair changes  HEME: NEGATIVE for bleeding problems  PSYCHIATRIC: NEGATIVE for changes in mood or affect    Patient Active Problem List    Diagnosis Date Noted     Moderate episode of recurrent major depressive disorder (H) 09/04/2019     Priority: Medium     Generalized anxiety disorder 02/13/2015     Priority: Medium     Trichotillomania 02/13/2015     Priority: Medium     Other disorder of eating of nonorganic origin 02/13/2015     Priority: Medium      History reviewed. No pertinent past medical history.  Past Surgical History:   Procedure Laterality Date     LAPAROSCOPIC EVACUATION ECTOPIC PREGNANCY N/A 9/27/2020    Procedure: EVACUATION, ECTOPIC PREGNANCY, LAPAROSCOPIC;  Surgeon: Estephania Disla DO;  Location:  OR     LAPAROSCOPIC SALPINGECTOMY Right 9/27/2020    Procedure: Laparoscopic partial right salpingectomy;  Surgeon: Estephania Disla DO;  Location: [facility] OR     No current outpatient medications on " "file.       Allergies   Allergen Reactions     Azithromycin Swelling     Sulfamethoxazole-Trimethoprim Rash     possible        Social History     Tobacco Use     Smoking status: Former Smoker     Types: Vaping Device     Smokeless tobacco: Former User   Substance Use Topics     Alcohol use: Yes     Alcohol/week: 0.0 standard drinks     Comment: 0-1 per month      Family History   Problem Relation Age of Onset     Lupus Maternal Grandmother      Cerebrovascular Disease Paternal Grandmother      Chronic Obstructive Pulmonary Disease Maternal Grandfather      History   Drug Use Unknown         Objective     BP 98/62   Pulse 80   Temp 98.5  F (36.9  C) (Temporal)   Resp 18   Ht 1.59 m (5' 2.6\")   Wt 55.2 kg (121 lb 11.2 oz)   SpO2 99%   Breastfeeding Unknown   BMI 21.84 kg/m      Physical Exam    GENERAL APPEARANCE: healthy, alert and no distress     EYES: EOMI, PERRL     HENT: ear canals and TM's normal and nose and mouth without ulcers or lesions     NECK: no adenopathy, no asymmetry, masses, or scars and thyroid normal to palpation     RESP: lungs clear to auscultation - no rales, rhonchi or wheezes     CV: regular rates and rhythm, normal S1 S2, no S3 or S4 and no murmur, click or rub     ABDOMEN:  soft, nontender, no HSM or masses and bowel sounds normal     MS: extremities normal- no gross deformities noted, no evidence of inflammation in joints, FROM in all extremities.     SKIN: no suspicious lesions or rashes     NEURO: Normal strength and tone, sensory exam grossly normal, mentation intact and speech normal     PSYCH: mentation appears normal. and affect normal/bright     LYMPHATICS: No cervical adenopathy    Recent Labs   Lab Test 09/27/20 2000   HGB 11.9         POTASSIUM 4.0   CR 0.57        Diagnostics:  Recent Results (from the past 24 hour(s))   CBC with platelets    Collection Time: 08/23/21  2:03 PM   Result Value Ref Range    WBC Count 5.6 4.0 - 11.0 10e3/uL    RBC Count 4.11 " 3.80 - 5.20 10e6/uL    Hemoglobin 12.9 11.7 - 15.7 g/dL    Hematocrit 38.8 35.0 - 47.0 %    MCV 94 78 - 100 fL    MCH 31.4 26.5 - 33.0 pg    MCHC 33.2 31.5 - 36.5 g/dL    RDW 12.3 10.0 - 15.0 %    Platelet Count 302 150 - 450 10e3/uL   HCG Qual, Urine ([id number])    Collection Time: 08/23/21  3:20 PM   Result Value Ref Range    hCG Urine Qualitative Negative Negative      No EKG required, no history of coronary heart disease, significant arrhythmia, peripheral arterial disease or other structural heart disease.    Revised Cardiac Risk Index (RCRI):  The patient has the following serious cardiovascular risks for perioperative complications:   - No serious cardiac risks = 0 points     RCRI Interpretation: 0 points: Class I (very low risk - 0.4% complication rate)           Signed Electronically by: Charlette Cartagena PA-C  Copy of this evaluation report is provided to requesting physician.      Answers for HPI/ROS submitted by the patient on 8/23/2021  If you checked off any problems, how difficult have these problems made it for you to do your work, take care of things at home, or get along with other people?: Not difficult at all  PHQ9 TOTAL SCORE: 10

## 2021-08-20 NOTE — PATIENT INSTRUCTIONS

## 2021-08-23 ENCOUNTER — OFFICE VISIT (OUTPATIENT)
Dept: FAMILY MEDICINE | Facility: CLINIC | Age: 26
End: 2021-08-23

## 2021-08-23 VITALS
HEART RATE: 80 BPM | RESPIRATION RATE: 18 BRPM | BODY MASS INDEX: 21.56 KG/M2 | OXYGEN SATURATION: 99 % | SYSTOLIC BLOOD PRESSURE: 98 MMHG | WEIGHT: 121.7 LBS | TEMPERATURE: 98.5 F | DIASTOLIC BLOOD PRESSURE: 62 MMHG | HEIGHT: 63 IN

## 2021-08-23 DIAGNOSIS — Z01.818 PREOP GENERAL PHYSICAL EXAM: Primary | ICD-10-CM

## 2021-08-23 DIAGNOSIS — Z41.1 ENCOUNTER FOR COSMETIC SURGERY: ICD-10-CM

## 2021-08-23 LAB
ERYTHROCYTE [DISTWIDTH] IN BLOOD BY AUTOMATED COUNT: 12.3 % (ref 10–15)
HCG UR QL: NEGATIVE
HCT VFR BLD AUTO: 38.8 % (ref 35–47)
HGB BLD-MCNC: 12.9 G/DL (ref 11.7–15.7)
MCH RBC QN AUTO: 31.4 PG (ref 26.5–33)
MCHC RBC AUTO-ENTMCNC: 33.2 G/DL (ref 31.5–36.5)
MCV RBC AUTO: 94 FL (ref 78–100)
PLATELET # BLD AUTO: 302 10E3/UL (ref 150–450)
RBC # BLD AUTO: 4.11 10E6/UL (ref 3.8–5.2)
WBC # BLD AUTO: 5.6 10E3/UL (ref 4–11)

## 2021-08-23 PROCEDURE — 99214 OFFICE O/P EST MOD 30 MIN: CPT | Performed by: PHYSICIAN ASSISTANT

## 2021-08-23 PROCEDURE — 81025 URINE PREGNANCY TEST: CPT | Performed by: PHYSICIAN ASSISTANT

## 2021-08-23 PROCEDURE — 85027 COMPLETE CBC AUTOMATED: CPT | Performed by: PHYSICIAN ASSISTANT

## 2021-08-23 PROCEDURE — 36415 COLL VENOUS BLD VENIPUNCTURE: CPT | Performed by: PHYSICIAN ASSISTANT

## 2021-08-23 ASSESSMENT — PATIENT HEALTH QUESTIONNAIRE - PHQ9
SUM OF ALL RESPONSES TO PHQ QUESTIONS 1-9: 10
SUM OF ALL RESPONSES TO PHQ QUESTIONS 1-9: 10
10. IF YOU CHECKED OFF ANY PROBLEMS, HOW DIFFICULT HAVE THESE PROBLEMS MADE IT FOR YOU TO DO YOUR WORK, TAKE CARE OF THINGS AT HOME, OR GET ALONG WITH OTHER PEOPLE: NOT DIFFICULT AT ALL

## 2021-08-23 ASSESSMENT — MIFFLIN-ST. JEOR: SCORE: 1264.77

## 2021-08-24 ASSESSMENT — PATIENT HEALTH QUESTIONNAIRE - PHQ9: SUM OF ALL RESPONSES TO PHQ QUESTIONS 1-9: 10

## 2021-09-19 ENCOUNTER — HEALTH MAINTENANCE LETTER (OUTPATIENT)
Age: 26
End: 2021-09-19

## 2022-01-09 ENCOUNTER — HEALTH MAINTENANCE LETTER (OUTPATIENT)
Age: 27
End: 2022-01-09

## 2022-03-02 ENCOUNTER — TELEPHONE (OUTPATIENT)
Dept: FAMILY MEDICINE | Facility: OTHER | Age: 27
End: 2022-03-02

## 2022-03-02 ENCOUNTER — LAB (OUTPATIENT)
Dept: LAB | Facility: CLINIC | Age: 27
End: 2022-03-02

## 2022-03-02 DIAGNOSIS — Z87.59 HX OF ECTOPIC PREGNANCY: Primary | ICD-10-CM

## 2022-03-02 DIAGNOSIS — Z87.59 HX OF ECTOPIC PREGNANCY: ICD-10-CM

## 2022-03-02 LAB — B-HCG SERPL-ACNC: 2523 IU/L (ref 0–5)

## 2022-03-02 PROCEDURE — 36415 COLL VENOUS BLD VENIPUNCTURE: CPT

## 2022-03-02 PROCEDURE — 84702 CHORIONIC GONADOTROPIN TEST: CPT

## 2022-03-02 NOTE — TELEPHONE ENCOUNTER
RN called pt to further triage based on previous triage RN note.    Now   Positive HPT 2 days ago.  LMP 22, currenlty  4w 4d.  2020 evacuation of ectopic pregnancy, laparoscopic and partial right salpingectomy.    Pt wants to abort pregnancy however wants to know location of pregnancy per previous ectopic and having 1 fallopian tube left.    Denies current pain or vaginal bleeding.  Denies having pregnancy symptoms, other than mild cramping Feb, 26-27.   ED precautions given.  Pt verbalized understanding and agreement.    Ok to leave detailed message.    Routing to provider for advice on next steps in determining location of pregnancy and termination process, also spoke to provider on phone (on call today) for SBAR.    Orders received for HCG today and in 2 days and advise pt to [facility] or other  clinics (sent list via [employer]) and schedule an appointment where the  clinic can follow up with ultrasound and termination medication/treamtment.    RN spoke with pt to relayed providers message and assisted in scheduling labs.    Anel Medley, JESÚSN RN

## 2022-03-02 NOTE — TELEPHONE ENCOUNTER
Call from patient. She had a home positive pregnancy test 2 days ago.   Patient went to a local women's health clinic and also confirmed positive pregnancy test.     Patient has a history of ectopic pregnancies in the past. She would like to have an  and is wondering if this pregnancy is also ectopic would taking the  pill would be effective in terminating pregnancy.     Patient states she is approximately 4 weeks along.     Routing message to OB team to further assist with information, recommendations, and referrals.       Imani ESPINOSAN, RN

## 2022-03-04 ENCOUNTER — LAB (OUTPATIENT)
Dept: LAB | Facility: OTHER | Age: 27
End: 2022-03-04

## 2022-03-04 DIAGNOSIS — Z87.59 HX OF ECTOPIC PREGNANCY: ICD-10-CM

## 2022-03-04 LAB — B-HCG SERPL-ACNC: 5208 IU/L (ref 0–5)

## 2022-03-04 PROCEDURE — 36415 COLL VENOUS BLD VENIPUNCTURE: CPT

## 2022-03-04 PROCEDURE — 84702 CHORIONIC GONADOTROPIN TEST: CPT

## 2022-03-19 ENCOUNTER — MYC MEDICAL ADVICE (OUTPATIENT)
Dept: FAMILY MEDICINE | Facility: CLINIC | Age: 27
End: 2022-03-19

## 2022-04-03 NOTE — TELEPHONE ENCOUNTER
Patient Quality Outreach    Patient is due for the following:   Cervical Cancer Screening - PAP Needed  Depression  -  PHQ-9 Needed due 4/24/22  Physical  - due now  Chlamydia  Immunizations  -  Covid and Influenza    NEXT STEPS:   Schedule a yearly physical    Type of outreach:    Sent [employer] message.      Questions for provider review:    None     Dot Garcia, CMA

## 2022-04-11 NOTE — TELEPHONE ENCOUNTER
Patient Quality Outreach    Patient is due for the following:   Cervical Cancer Screening - PAP Needed  Depression  -  PHQ-9 Needed due by 4/24/22  Physical  - due now  Chlamydia  Immunizations  -  Covid and Influenza    NEXT STEPS:   Schedule a yearly physical    Type of outreach:    Call to complete PHQ/SEEMA and offer to help schedule physical  Route back to me if unable to reach. Can close if schedules or declines.    Next Steps:  Reach out within 90 days via [employer].    Max number of attempts reached: Yes. Will try again in 90 days if patient still on fail list.    Questions for provider review:    None     Dot Garcia, [facility]  Chart routed to Care Team.

## 2022-04-12 NOTE — TELEPHONE ENCOUNTER
Left message for pt to return call, when call is returned give information below and help schedule physical. Transfer to MA to complete PHQ9/SEEMA or have pt log into [employer] to read message and complete questionnaires.     Dot Garcia CMA ([facility])

## 2022-08-19 ENCOUNTER — MYC MEDICAL ADVICE (OUTPATIENT)
Dept: FAMILY MEDICINE | Facility: CLINIC | Age: 27
End: 2022-08-19

## 2022-08-19 NOTE — LETTER
[facility]  [address]  [place] [address]  Phone: [phone]  Fax: [phone]  August 26, 2022      Angella Sutton  [address]  [place] [address]      Dear Angella,    We care about your health and have reviewed your health plan including your medical conditions, medications, and lab results.  Based on this review, it is recommended that you follow up regarding the following health topic(s):  -Depression  -Cervical Cancer Screening  -Wellness (Physical) Visit   -Immunizations:      Topic Date Due     COVID-19 Vaccine (1) Never done     Flu Vaccine (1) 09/01/2022     We recommend you take the following action(s):  -schedule a WELLNESS (Physical) APPOINTMENT.  We will perform the following labs: pap/hpv .  -schedule a PAP SMEAR EXAM which is due.  Please disregard this reminder if you have had this exam elsewhere within the last year.  It would be helpful for us to have a copy of your recent pap smear report to update your records.  -Complete and return the attached PHQ-9 Form.  If your total score is greater than 9, please schedule a followup appointment.  If you answer Yes to question 9, call your clinic between the hours of 8 to 5.  You may also call the Suicide Hotline at 6-244-899-WIFT (3964) any time.     Please call us at the [facility] [phone] (or use [employer]) to address the above recommendations.     Thank you for trusting [facility] and we appreciate the opportunity to serve you.  We look forward to supporting your healthcare needs in the future.    Healthy Regards,    Your Health Care Team  [facility]

## 2022-08-19 NOTE — TELEPHONE ENCOUNTER
Patient Quality Outreach    Patient is due for the following:   Cervical Cancer Screening - PAP Needed  Depression  -  PHQ-9 needed  Physical Preventive Adult Physical      Topic Date Due     COVID-19 Vaccine (1) Never done       Next Steps:   Schedule a Adult Preventative    Type of outreach:    Sent [employer] message.      Questions for provider review:    None     Dot Garcia, [facility]  Chart routed to Care Team.

## 2022-08-26 NOTE — TELEPHONE ENCOUNTER
Patient Quality Outreach    Patient is due for the following:   Cervical Cancer Screening - PAP Needed  Depression  -  PHQ-9 needed  Physical Preventive Adult Physical      Topic Date Due     COVID-19 Vaccine (1) Never done     Flu Vaccine (1) 09/01/2022       Next Steps:   Schedule a Adult Preventative    Type of outreach:    Sent letter.    Next Steps:  Reach out within 90 days via [employer].    Max number of attempts reached: Yes. Will try again in 90 days if patient still on fail list.    Questions for provider review:    None     Dot Garcia, CMA

## 2022-11-20 ENCOUNTER — HEALTH MAINTENANCE LETTER (OUTPATIENT)
Age: 27
End: 2022-11-20

## 2023-04-15 ENCOUNTER — HEALTH MAINTENANCE LETTER (OUTPATIENT)
Age: 28
End: 2023-04-15

## 2024-06-22 ENCOUNTER — HEALTH MAINTENANCE LETTER (OUTPATIENT)
Age: 29
End: 2024-06-22

## 2024-08-07 NOTE — LETTER
[facility]  [address]  [place] [address]  Phone: [phone]  Fax: [phone]  April 20, 2022      Angella Sutton  [address]  [place] [address]      Dear Angella,    We care about your health and have reviewed your health plan including your medical conditions, medications, and lab results.  Based on this review, it is recommended that you follow up regarding the following health topic(s):  -Depression  -Cervical Cancer Screening  -Wellness (Physical) Visit   -Chlamydia Screening  -Immunizations-COVID and influenza    We recommend you take the following action(s):  -schedule a WELLNESS (Physical) APPOINTMENT.  We will perform the following labs: Chlamydia.  -schedule a PAP SMEAR EXAM which is due.  Please disregard this reminder if you have had this exam elsewhere within the last year.  It would be helpful for us to have a copy of your recent pap smear report to update your records.  -Complete and return the attached PHQ-9 Form.  If your total score is greater than 9, please schedule a followup appointment.  If you answer Yes to question 9, call your clinic between the hours of 8 to 5.  You may also call the Suicide Hotline at 7-039-292-LCJK (8655) any time.     Please call us at the [facility] [phone] (or use [employer]) to address the above recommendations.     Thank you for trusting [facility] and we appreciate the opportunity to serve you.  We look forward to supporting your healthcare needs in the future.    Healthy Regards,    Your Health Care Team  [facility]  
My signature below certifies that the above stated patient is homebound and upon completion of the Face-To-Face encounter, has the need for intermittent skilled nursing, physical therapy and/or speech or occupational therapy services in their home for their current diagnosis as outlined in their initial plan of care. These services will continue to be monitored by myself or another physician.

## 2024-10-25 ENCOUNTER — HOSPITAL ENCOUNTER (EMERGENCY)
Facility: CLINIC | Age: 29
Discharge: HOME | End: 2024-10-26
Attending: FAMILY MEDICINE | Admitting: FAMILY MEDICINE
Payer: COMMERCIAL

## 2024-10-25 DIAGNOSIS — L50.9 HIVES: ICD-10-CM

## 2024-10-25 PROCEDURE — 99284 EMERGENCY DEPT VISIT MOD MDM: CPT | Performed by: FAMILY MEDICINE

## 2024-10-25 PROCEDURE — 250N000013 HC RX MED GY IP 250 OP 250 PS 637: Performed by: FAMILY MEDICINE

## 2024-10-25 PROCEDURE — 99285 EMERGENCY DEPT VISIT HI MDM: CPT

## 2024-10-25 RX ORDER — CETIRIZINE HYDROCHLORIDE 10 MG/1
20 TABLET ORAL ONCE
Status: COMPLETED | OUTPATIENT
Start: 2024-10-25 | End: 2024-10-25

## 2024-10-25 RX ORDER — FAMOTIDINE 20 MG/1
40 TABLET, FILM COATED ORAL ONCE
Status: COMPLETED | OUTPATIENT
Start: 2024-10-25 | End: 2024-10-25

## 2024-10-25 RX ADMIN — FAMOTIDINE 40 MG: 20 TABLET, FILM COATED ORAL at 22:51

## 2024-10-25 RX ADMIN — CETIRIZINE HYDROCHLORIDE 20 MG: 10 TABLET, FILM COATED ORAL at 22:51

## 2024-10-25 ASSESSMENT — ACTIVITIES OF DAILY LIVING (ADL): ADLS_ACUITY_SCORE: 17.25

## 2024-10-25 ASSESSMENT — COLUMBIA-SUICIDE SEVERITY RATING SCALE - C-SSRS
1. IN THE PAST MONTH, HAVE YOU WISHED YOU WERE DEAD OR WISHED YOU COULD GO TO SLEEP AND NOT WAKE UP?: NO
6. HAVE YOU EVER DONE ANYTHING, STARTED TO DO ANYTHING, OR PREPARED TO DO ANYTHING TO END YOUR LIFE?: NO
2. HAVE YOU ACTUALLY HAD ANY THOUGHTS OF KILLING YOURSELF IN THE PAST MONTH?: NO

## 2024-10-26 VITALS
OXYGEN SATURATION: 99 % | DIASTOLIC BLOOD PRESSURE: 95 MMHG | RESPIRATION RATE: 15 BRPM | TEMPERATURE: 98.4 F | HEART RATE: 87 BPM | SYSTOLIC BLOOD PRESSURE: 130 MMHG

## 2024-10-26 PROCEDURE — 250N000009 HC RX 250: Performed by: FAMILY MEDICINE

## 2024-10-26 PROCEDURE — 96372 THER/PROPH/DIAG INJ SC/IM: CPT | Performed by: FAMILY MEDICINE

## 2024-10-26 RX ORDER — FAMOTIDINE 40 MG/1
40 TABLET, FILM COATED ORAL 2 TIMES DAILY
Qty: 20 TABLET | Refills: 0 | Status: SHIPPED | OUTPATIENT
Start: 2024-10-26 | End: 2024-11-05

## 2024-10-26 RX ORDER — EPINEPHRINE 0.3 MG/.3ML
0.3 INJECTION SUBCUTANEOUS
Qty: 1 EACH | Refills: 0 | Status: SHIPPED | OUTPATIENT
Start: 2024-10-26

## 2024-10-26 RX ORDER — CETIRIZINE HYDROCHLORIDE 10 MG/1
TABLET ORAL
Qty: 20 TABLET | Refills: 1 | Status: SHIPPED | OUTPATIENT
Start: 2024-10-26

## 2024-10-26 RX ADMIN — EPINEPHRINE 0.3 MG: 1 INJECTION INTRAMUSCULAR; INTRAVENOUS; SUBCUTANEOUS at 00:03

## 2024-10-26 ASSESSMENT — ENCOUNTER SYMPTOMS
FACIAL SWELLING: 0
GASTROINTESTINAL NEGATIVE: 1
SHORTNESS OF BREATH: 0
COUGH: 0

## 2024-10-26 NOTE — ED TRIAGE NOTES
Pt was seen in clinic for unknown allergic reaction today. Pt has taken 60 mg of Prednisone for beginning taper dose, 50 mg of Benadryl. Hives continue to spread up her back, neck and chest. Not improving.      Triage Assessment (Adult)       Row Name 10/25/24 6238          Triage Assessment    Airway WDL WDL        Respiratory WDL    Respiratory WDL WDL        Skin Circulation/Temperature WDL    Skin Circulation/Temperature WDL X  Rash/hives neck, chest, back, thigh, legs.        Peripheral/Neurovascular WDL    Peripheral Neurovascular WDL WDL        Cognitive/Neuro/Behavioral WDL    Cognitive/Neuro/Behavioral WDL WDL

## 2024-10-26 NOTE — DISCHARGE INSTRUCTIONS
Please read and follow the handout(s) instructions. Return, if needed, for increased or worsening symptoms and as directed by the handout(s).

## 2024-10-26 NOTE — ED PROVIDER NOTES
History     Chief Complaint   Patient presents with    Hives     HPI  Angella Sutton is a 29 year old female who presented emergency room today secondary concerns of hives and itching covering most of her chest, back, and legs bilaterally.  Patient states that she woke this morning with the hives on her legs from her waist down.  She states that she went to an urgent care who gave her a dose of prednisone and a prescription for 2 more days and a tapering course.  They told her to take some Benadryl which she last took about 7:00 PM.  She states that she did not notice any improvement in her hives with those medications and they seem to be getting worse this evening so she comes in again to get some help.  Has never had similar symptoms in the past.  She is not aware of any new medications or specific reason for why the hives would start.  He denies possibility of pregnancy.  She states that she is otherwise healthy.  He does have a history for rash reaction to sulfa and azithromycin in the past.  She has not been exposed to those medications that she is aware of.  She denies any mouth or facial swelling and denies any shortness of breath symptoms.    Allergies:  Allergies   Allergen Reactions    Azithromycin Swelling    Sulfamethoxazole-Trimethoprim Rash     possible       Problem List:    Patient Active Problem List    Diagnosis Date Noted    Moderate episode of recurrent major depressive disorder (H) 09/04/2019     Priority: Medium    Generalized anxiety disorder 02/13/2015     Priority: Medium    Trichotillomania 02/13/2015     Priority: Medium    Other disorder of eating of nonorganic origin 02/13/2015     Priority: Medium        Past Medical History:    No past medical history on file.    Past Surgical History:    Past Surgical History:   Procedure Laterality Date    DENTAL SURGERY      LAPAROSCOPIC EVACUATION ECTOPIC PREGNANCY N/A 09/27/2020    Procedure: EVACUATION, ECTOPIC PREGNANCY, LAPAROSCOPIC;  Surgeon:  Estephania Disla DO;  Location: [facility] OR    LAPAROSCOPIC SALPINGECTOMY Right 09/27/2020    Procedure: Laparoscopic partial right salpingectomy;  Surgeon: Estephania Disla DO;  Location: [facility] OR       Family History:    Family History   Problem Relation Age of Onset    No Known Problems Father     Lupus Maternal Grandmother     Chronic Obstructive Pulmonary Disease Maternal Grandfather     Cerebrovascular Disease Paternal Grandmother        Social History:  Marital Status:  Single [1]  Social History     Tobacco Use    Smoking status: Former     Types: Vaping Device    Smokeless tobacco: Former    Tobacco comments:     quit vaping 06/2021   Vaping Use    Vaping status: Never Used   Substance Use Topics    Alcohol use: Yes     Alcohol/week: 0.0 standard drinks of alcohol     Comment: 0-1 per month     Drug use: Not Currently        Medications:    cetirizine (ZYRTEC) 10 MG tablet  EPINEPHrine (EPIPEN 2-TANISHA) 0.3 MG/0.3ML injection 2-pack  famotidine (PEPCID) 40 MG tablet          Review of Systems   HENT:  Negative for facial swelling.    Respiratory:  Negative for cough and shortness of breath.    Gastrointestinal: Negative.    Genitourinary: Negative.    Skin:  Positive for rash (Increasingly severe hives with itching).   All other systems reviewed and are negative.      Physical Exam   BP: (!) 130/95  Pulse: 107  Temp: 98.4  F (36.9  C)  Resp: 16  SpO2: 99 %      Physical Exam  Vitals and nursing note reviewed. Exam conducted with a chaperone present.   Constitutional:       General: She is in acute distress (Itching skin.).      Appearance: She is not ill-appearing, toxic-appearing or diaphoretic.   HENT:      Head: Normocephalic and atraumatic.      Mouth/Throat:      Mouth: Mucous membranes are moist.      Pharynx: No posterior oropharyngeal erythema.   Eyes:      General: No scleral icterus.     Conjunctiva/sclera: Conjunctivae normal.      Pupils: Pupils are equal, round, and reactive to light.   Cardiovascular:       Rate and Rhythm: Normal rate.      Pulses: Normal pulses.   Pulmonary:      Effort: Pulmonary effort is normal. No respiratory distress.      Breath sounds: No wheezing.   Musculoskeletal:      Cervical back: Normal range of motion and neck supple.   Skin:     Capillary Refill: Capillary refill takes less than 2 seconds.      Findings: Rash present. Rash is urticarial.      Comments: Patient with hive-like lesions noted to the trunk both front and back as well as to her extremities bilaterally.  Mild swelling noted to the eyelids but otherwise no significant facial swelling or hive-like reaction seen.  See picture of her back.   Neurological:      Mental Status: She is alert and oriented to person, place, and time.   Psychiatric:         Mood and Affect: Mood normal.         Behavior: Behavior normal.         ED Course        Procedures              Critical Care time:  none              No results found for this or any previous visit (from the past 24 hours).    Medications   cetirizine (zyrTEC) tablet 20 mg (20 mg Oral $Given 10/25/24 2251)   famotidine (PEPCID) tablet 40 mg (40 mg Oral $Given 10/25/24 2251)   EPINEPHrine (ADRENALIN) kit 0.3-0.5 mg (0.3 mg Intramuscular $Given 10/26/24 0003)       Assessments & Plan (with Medical Decision Making)  29-year-old female to the ER secondary concerns of hive-like lesions for started this morning.  She was seen in urgent care this afternoon and started on prednisone and Benadryl.  Despite that her hives increased this evening so she came here for reevaluation.  Fortunately, patient without evidence of any facial swelling or difficulty breathing.  Patient does have significant hives covering most of her body including the extremities and trunk posterior and anteriorly.  Patient is already on steroid treatment so was given both Zyrtec and famotidine orally.  She did have some improvement but it was slow so additional epinephrine given with resolution of the hives.   She was monitored for additional hour without return so decision was made to discharge to home with continued use of the prednisone as well as cetirizine and famotidine.  Prescriptions for both of those medicines were sent home with her.  She already had filled a prescription for the prednisone.  She was given handouts instructing her regarding urticaria and hive-like conditions.  I have asked her to return to the ER for increase or worsening symptoms as directed on the handout if needed.  I did offer her a prescription for an EpiPen to have in case she developed more serious symptoms such as facial swelling, tongue swelling, or difficulty breathing.  Patient plans to make an appointment with an allergist for further evaluation as to the causative reason for the hives.     I have reviewed the nursing notes.    I have reviewed the findings, diagnosis, plan and need for follow up with the patient.      Discharge Medication List as of 10/26/2024 12:38 AM        START taking these medications    Details   cetirizine (ZYRTEC) 10 MG tablet 1 tab up to three times a day for itch/rash, Disp-20 tablet, R-1, E-Prescribe      EPINEPHrine (EPIPEN 2-TANISHA) 0.3 MG/0.3ML injection 2-pack Inject 0.3 mLs (0.3 mg) into the muscle once as needed for anaphylaxis., Disp-1 each, R-0, Local Print      famotidine (PEPCID) 40 MG tablet Take 1 tablet (40 mg) by mouth 2 times daily for 10 days., Disp-20 tablet, R-0, E-Prescribe                  I verbally discussed the findings of the evaluation today in the ER. I have verbally discussed with Angella the suggested treatment(s) as described in the discharge instructions and handouts. I have prescribed the above listed medications and instructed her on appropriate use of these medications.      I have verbally suggested she follow-up in her clinic or return to the ER for increased symptoms. See the follow-up recommendations documented  in the after visit summary in this visit's EPIC  chart.      Disclaimer: This note consists of words and symbols derived from keyboarding and dictation using voice recognition software.  As a result, there may be errors that have gone undetected.  Please consider this when interpreting information found in this note.    Final diagnoses:   Hives       10/25/2024   [facility] EMERGENCY DEPT       Duane Kennedy,   10/26/24 0121

## 2025-07-12 ENCOUNTER — HEALTH MAINTENANCE LETTER (OUTPATIENT)
Age: 30
End: 2025-07-12

## (undated) DEVICE — GLOVE PROTEXIS W/NEU-THERA 7.5  2D73TE75

## (undated) DEVICE — ENDO TROCAR BLADED 05X100MM D5LT

## (undated) DEVICE — BLADE KNIFE SURG 11 371111

## (undated) DEVICE — ENDO TROCAR DILATING TIP 10/11X100MM D11LT

## (undated) DEVICE — GLOVE PROTEXIS W/NEU-THERA 5.5  2D73TE55

## (undated) DEVICE — SU VICRYL 4-0 PS-2 27" UND J426H

## (undated) DEVICE — ENDO TROCAR 05MM STEP S101005

## (undated) DEVICE — NDL INSUFFLATION 14GA STEP S100000

## (undated) DEVICE — ESU LIGASURE ATLAS 10MM  LS1037

## (undated) DEVICE — TUBING C02 INSUFFLATION W/FILTER

## (undated) DEVICE — GLOVE PROTEXIS W/NEU-THERA 7.0  2D73TE70

## (undated) DEVICE — PACK LAPAROSCOPY/PELVISCOPY STD

## (undated) DEVICE — NDL ECLIPSE 25GA 1.5"

## (undated) DEVICE — LABEL MEDICATION SYSTEM  3304

## (undated) DEVICE — GLOVE ESTEEM BLUE W/NEU-THERA 6.0  2D73PB60

## (undated) DEVICE — ENDO POUCH 10MM POUCH

## (undated) RX ORDER — PROPOFOL 10 MG/ML
INJECTION, EMULSION INTRAVENOUS
Status: DISPENSED
Start: 2020-09-27

## (undated) RX ORDER — LIDOCAINE HYDROCHLORIDE 20 MG/ML
INJECTION, SOLUTION EPIDURAL; INFILTRATION; INTRACAUDAL; PERINEURAL
Status: DISPENSED
Start: 2020-09-27

## (undated) RX ORDER — BUPIVACAINE HYDROCHLORIDE AND EPINEPHRINE 2.5; 5 MG/ML; UG/ML
INJECTION, SOLUTION EPIDURAL; INFILTRATION; INTRACAUDAL; PERINEURAL
Status: DISPENSED
Start: 2020-09-27

## (undated) RX ORDER — FENTANYL CITRATE 50 UG/ML
INJECTION, SOLUTION INTRAMUSCULAR; INTRAVENOUS
Status: DISPENSED
Start: 2020-09-27

## (undated) RX ORDER — FENTANYL CITRATE-0.9 % NACL/PF 10 MCG/ML
PLASTIC BAG, INJECTION (ML) INTRAVENOUS
Status: DISPENSED
Start: 2020-09-27

## (undated) RX ORDER — DEXAMETHASONE SODIUM PHOSPHATE 10 MG/ML
INJECTION, SOLUTION INTRAMUSCULAR; INTRAVENOUS
Status: DISPENSED
Start: 2020-09-27

## (undated) RX ORDER — ONDANSETRON 2 MG/ML
INJECTION INTRAMUSCULAR; INTRAVENOUS
Status: DISPENSED
Start: 2020-09-27